# Patient Record
Sex: FEMALE | Race: WHITE | Employment: UNEMPLOYED | URBAN - METROPOLITAN AREA
[De-identification: names, ages, dates, MRNs, and addresses within clinical notes are randomized per-mention and may not be internally consistent; named-entity substitution may affect disease eponyms.]

---

## 2017-01-01 ENCOUNTER — HOSPITAL ENCOUNTER (OUTPATIENT)
Dept: OTHER | Age: 32
Discharge: HOME | End: 2017-01-31
Attending: OBSTETRICS & GYNECOLOGY | Admitting: OBSTETRICS & GYNECOLOGY

## 2017-01-05 ENCOUNTER — ROUTINE PRENATAL (OUTPATIENT)
Dept: PERINATAL CARE | Age: 32
End: 2017-01-05

## 2017-01-05 DIAGNOSIS — Z36.9 ANTENATAL SCREENING ENCOUNTER: Primary | ICD-10-CM

## 2017-01-12 ENCOUNTER — HOSPITAL ENCOUNTER (OUTPATIENT)
Dept: GENERAL RADIOLOGY | Age: 32
Discharge: HOME | End: 2017-01-12
Attending: INTERNAL MEDICINE | Admitting: INTERNAL MEDICINE

## 2017-01-12 ENCOUNTER — ROUTINE PRENATAL (OUTPATIENT)
Dept: PERINATAL CARE | Age: 32
End: 2017-01-12

## 2017-01-12 DIAGNOSIS — Z36.9 ANTENATAL SCREENING ENCOUNTER: Primary | ICD-10-CM

## 2017-01-12 LAB
ANION GAP SERPL CALCULATED.3IONS-SCNC: 13 MMOL/L (ref 3–16)
BUN BLDV-MCNC: 5 MG/DL (ref 7–20)
CALCIUM SERPL-MCNC: 9.2 MG/DL (ref 8.3–10.6)
CHLORIDE BLD-SCNC: 102 MMOL/L (ref 99–110)
CO2: 23 MMOL/L (ref 21–32)
CREAT SERPL-MCNC: <0.5 MG/DL (ref 0.6–1.1)
CREATININE URINE: 156.1 MG/DL (ref 28–259)
GFR AFRICAN AMERICAN: >60
GFR NON-AFRICAN AMERICAN: >60
GLUCOSE BLD-MCNC: 78 MG/DL (ref 70–99)
MAGNESIUM: 1.8 MG/DL (ref 1.8–2.4)
POTASSIUM SERPL-SCNC: 3.9 MMOL/L (ref 3.5–5.1)
PROTEIN PROTEIN: 23 MG/DL
SODIUM BLD-SCNC: 138 MMOL/L (ref 136–145)

## 2017-01-19 ENCOUNTER — ROUTINE PRENATAL (OUTPATIENT)
Dept: PERINATAL CARE | Age: 32
End: 2017-01-19

## 2017-01-19 DIAGNOSIS — Z36.9 ANTENATAL SCREENING ENCOUNTER: Primary | ICD-10-CM

## 2017-01-26 ENCOUNTER — ROUTINE PRENATAL (OUTPATIENT)
Dept: PERINATAL CARE | Age: 32
End: 2017-01-26

## 2017-01-26 DIAGNOSIS — Z36.9 ANTENATAL SCREENING ENCOUNTER: Primary | ICD-10-CM

## 2017-01-31 PROBLEM — O99.213 OBESITY COMPLICATING PREGNANCY IN THIRD TRIMESTER: Status: ACTIVE | Noted: 2017-01-31

## 2017-02-01 ENCOUNTER — HOSPITAL ENCOUNTER (OUTPATIENT)
Dept: OTHER | Age: 32
Discharge: HOME | End: 2017-02-28
Attending: OBSTETRICS & GYNECOLOGY | Admitting: OBSTETRICS & GYNECOLOGY

## 2017-02-01 PROBLEM — Z98.891 STATUS POST PRIMARY LOW TRANSVERSE CESAREAN SECTION: Status: ACTIVE | Noted: 2017-02-01

## 2017-08-03 DIAGNOSIS — M79.672 BILATERAL FOOT PAIN: Primary | ICD-10-CM

## 2017-08-03 DIAGNOSIS — M79.671 BILATERAL FOOT PAIN: Primary | ICD-10-CM

## 2017-08-03 PROCEDURE — 73630 X-RAY EXAM OF FOOT: CPT | Performed by: ORTHOPAEDIC SURGERY

## 2017-11-17 ENCOUNTER — TELEPHONE (OUTPATIENT)
Dept: BARIATRICS/WEIGHT MGMT | Age: 32
End: 2017-11-17

## 2017-11-29 ENCOUNTER — HOSPITAL ENCOUNTER (OUTPATIENT)
Dept: ULTRASOUND IMAGING | Age: 32
Discharge: HOME | End: 2017-11-29
Attending: FAMILY MEDICINE | Admitting: FAMILY MEDICINE

## 2017-11-29 DIAGNOSIS — R10.30 INGUINAL PAIN, UNSPECIFIED LATERALITY: ICD-10-CM

## 2017-11-29 DIAGNOSIS — R10.30 LOWER ABDOMINAL PAIN: ICD-10-CM

## 2018-02-07 ENCOUNTER — OFFICE VISIT (OUTPATIENT)
Dept: ORTHOPEDIC SURGERY | Age: 33
End: 2018-02-07

## 2018-02-07 VITALS
DIASTOLIC BLOOD PRESSURE: 78 MMHG | WEIGHT: 293 LBS | SYSTOLIC BLOOD PRESSURE: 111 MMHG | HEIGHT: 67 IN | BODY MASS INDEX: 45.99 KG/M2 | HEART RATE: 90 BPM

## 2018-02-07 DIAGNOSIS — M54.6 THORACIC SPINE PAIN: ICD-10-CM

## 2018-02-07 DIAGNOSIS — M54.9 BACK PAIN, UNSPECIFIED BACK LOCATION, UNSPECIFIED BACK PAIN LATERALITY, UNSPECIFIED CHRONICITY: Primary | ICD-10-CM

## 2018-02-07 DIAGNOSIS — M79.18 MYOFACIAL MUSCLE PAIN: ICD-10-CM

## 2018-02-07 DIAGNOSIS — M54.2 CERVICAL PAIN: ICD-10-CM

## 2018-02-07 PROCEDURE — G8417 CALC BMI ABV UP PARAM F/U: HCPCS | Performed by: PHYSICAL MEDICINE & REHABILITATION

## 2018-02-07 PROCEDURE — G8427 DOCREV CUR MEDS BY ELIG CLIN: HCPCS | Performed by: PHYSICAL MEDICINE & REHABILITATION

## 2018-02-07 PROCEDURE — G8484 FLU IMMUNIZE NO ADMIN: HCPCS | Performed by: PHYSICAL MEDICINE & REHABILITATION

## 2018-02-07 PROCEDURE — 1036F TOBACCO NON-USER: CPT | Performed by: PHYSICAL MEDICINE & REHABILITATION

## 2018-02-07 PROCEDURE — 99204 OFFICE O/P NEW MOD 45 MIN: CPT | Performed by: PHYSICAL MEDICINE & REHABILITATION

## 2018-02-07 RX ORDER — TIZANIDINE 4 MG/1
TABLET ORAL
Qty: 90 TABLET | Refills: 0 | Status: SHIPPED | OUTPATIENT
Start: 2018-02-07 | End: 2018-05-14

## 2018-02-07 NOTE — PROGRESS NOTES
New Patient: SPINE    CHIEF COMPLAINT:    Chief Complaint   Patient presents with    Back Pain     whole back       HISTORY OF PRESENT ILLNESS:                The patient is a 27 y.o. female referred by  Dr Yamini Cadet for back pain. Atraumatic diffuse spine pain since 11 yo. No inciting event. Reports pain from neck to low back. No radiating extremity pain. No n/t, no weakness, no f/c; no b/b incont. Past Medical History:   Diagnosis Date    Anemia     Arthritis     Asthma     Back pain     Chronic kidney disease     proteinuria    Depression     bipolar depression-no meds    Macrosomia     history of in 2010 9lb 3 oz    Molar pregnancy     history of and also history of a partial molar pregnancy    Postpartum depression     baby blues after 2 deliveries    Proteinuria     Trichomonas infection 09/29/2016    history of    Vitamin D deficiency           Pain Assessment  Location of Pain: Back  Severity of Pain: 8  Quality of Pain: Sharp  Duration of Pain: Persistent  Frequency of Pain: Constant  Aggravating Factors: Other (Comment) (everything)  Limiting Behavior: Yes  Result of Injury: No  Work-Related Injury: No  Are there other pain locations you wish to document?: No    The pain assessment was noted & reviewed in the medical record today.      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    Past Medical History: Medical history form was reviewed today & scanned into the media tab  Past Medical History:   Diagnosis Date    Anemia     Arthritis     Asthma     Back pain     Chronic kidney disease     proteinuria    Depression     bipolar depression-no meds    Macrosomia     history of in 2010 9lb 3 oz    Molar pregnancy     history of and also history of a partial molar pregnancy    Postpartum depression     baby blues after 2 deliveries    Proteinuria  Trichomonas infection 09/29/2016    history of    Vitamin D deficiency       Past Surgical History:     Past Surgical History:   Procedure Laterality Date    ABDOMEN SURGERY      molar pregnancy 8/14    CHOLECYSTECTOMY      DILATION AND CURETTAGE OF UTERUS  8/19/13    SUCTION    OTHER SURGICAL HISTORY  8-21-14    D & E     Current Medications:     Current Outpatient Prescriptions:     albuterol (PROVENTIL HFA;VENTOLIN HFA) 108 (90 BASE) MCG/ACT inhaler, Inhale 2 puffs into the lungs every 6 hours as needed. , Disp: , Rfl:   Allergies:  Bactrim [sulfamethoxazole-trimethoprim]  Social History:    reports that she has never smoked. She has never used smokeless tobacco. She reports that she does not drink alcohol or use drugs. Family History:   Family History   Problem Relation Age of Onset    Arthritis Father     Diabetes Paternal Aunt     Mental Illness Paternal Aunt     Arthritis Paternal Aunt     Glaucoma Paternal Aunt     High Blood Pressure Paternal Grandmother     Stroke Paternal Grandmother     Diabetes Paternal Grandmother     High Blood Pressure Paternal Grandfather     Diabetes Paternal Grandfather        REVIEW OF SYSTEMS: Full ROS noted & scanned   CONSTITUTIONAL: Denies unexplained weight loss, fevers, chills or fatigue  NEUROLOGICAL: Denies unsteady gait or progressive weakness  MUSCULOSKELETAL: Denies joint swelling or redness  PSYCHOLOGICAL: Denies anxiety, depression   SKIN: Denies skin changes, delayed healing, rash, itching   HEMATOLOGIC: Denies easy bleeding or bruising  ENDOCRINE: Denies excessive thirst, urination, heat/cold  RESPIRATORY: Denies current dyspnea, cough  GI: Denies nausea, vomiting, diarrhea   : Denies bowel or bladder issues       PHYSICAL EXAM:    Vitals: Blood pressure 111/78, pulse 90, height 5' 7.01\" (1.702 m), weight (!) 320 lb 1.7 oz (145.2 kg), unknown if currently breastfeeding.     GENERAL EXAM:  · General Apparence: Patient is adequately groomed with

## 2018-05-14 ENCOUNTER — OFFICE VISIT (OUTPATIENT)
Dept: PULMONOLOGY | Age: 33
End: 2018-05-14

## 2018-05-14 VITALS
BODY MASS INDEX: 45.99 KG/M2 | RESPIRATION RATE: 18 BRPM | HEART RATE: 80 BPM | OXYGEN SATURATION: 98 % | DIASTOLIC BLOOD PRESSURE: 72 MMHG | SYSTOLIC BLOOD PRESSURE: 114 MMHG | WEIGHT: 293 LBS | HEIGHT: 67 IN | TEMPERATURE: 97.7 F

## 2018-05-14 DIAGNOSIS — R06.83 SNORING: Primary | ICD-10-CM

## 2018-05-14 DIAGNOSIS — R29.818 SUSPECTED SLEEP APNEA: ICD-10-CM

## 2018-05-14 DIAGNOSIS — R53.83 FATIGUE, UNSPECIFIED TYPE: ICD-10-CM

## 2018-05-14 PROCEDURE — 99203 OFFICE O/P NEW LOW 30 MIN: CPT | Performed by: NURSE PRACTITIONER

## 2018-05-14 PROCEDURE — G8427 DOCREV CUR MEDS BY ELIG CLIN: HCPCS | Performed by: NURSE PRACTITIONER

## 2018-05-14 PROCEDURE — G8417 CALC BMI ABV UP PARAM F/U: HCPCS | Performed by: NURSE PRACTITIONER

## 2018-05-14 PROCEDURE — 1036F TOBACCO NON-USER: CPT | Performed by: NURSE PRACTITIONER

## 2018-05-14 ASSESSMENT — SLEEP AND FATIGUE QUESTIONNAIRES
HOW LIKELY ARE YOU TO NOD OFF OR FALL ASLEEP WHILE SITTING AND READING: 1
HOW LIKELY ARE YOU TO NOD OFF OR FALL ASLEEP WHILE SITTING QUIETLY AFTER LUNCH WITHOUT ALCOHOL: 0
HOW LIKELY ARE YOU TO NOD OFF OR FALL ASLEEP IN A CAR, WHILE STOPPED FOR A FEW MINUTES IN TRAFFIC: 0
HOW LIKELY ARE YOU TO NOD OFF OR FALL ASLEEP WHILE SITTING AND TALKING TO SOMEONE: 0
NECK CIRCUMFERENCE (INCHES): 16.75
HOW LIKELY ARE YOU TO NOD OFF OR FALL ASLEEP WHEN YOU ARE A PASSENGER IN A CAR FOR AN HOUR WITHOUT A BREAK: 3
HOW LIKELY ARE YOU TO NOD OFF OR FALL ASLEEP WHILE LYING DOWN TO REST IN THE AFTERNOON WHEN CIRCUMSTANCES PERMIT: 1
HOW LIKELY ARE YOU TO NOD OFF OR FALL ASLEEP WHILE SITTING INACTIVE IN A PUBLIC PLACE: 0
HOW LIKELY ARE YOU TO NOD OFF OR FALL ASLEEP WHILE WATCHING TV: 2
ESS TOTAL SCORE: 7

## 2018-06-21 ENCOUNTER — HOSPITAL ENCOUNTER (OUTPATIENT)
Dept: SLEEP MEDICINE | Age: 33
Discharge: HOME | End: 2018-06-21
Attending: FAMILY MEDICINE | Admitting: FAMILY MEDICINE

## 2018-06-21 DIAGNOSIS — R06.83 SNORING: ICD-10-CM

## 2018-06-21 DIAGNOSIS — R29.818 SUSPECTED SLEEP APNEA: ICD-10-CM

## 2018-06-21 DIAGNOSIS — R53.83 FATIGUE, UNSPECIFIED TYPE: ICD-10-CM

## 2018-06-25 ENCOUNTER — TELEPHONE (OUTPATIENT)
Dept: PULMONOLOGY | Age: 33
End: 2018-06-25

## 2018-06-25 ENCOUNTER — HOSPITAL ENCOUNTER (OUTPATIENT)
Dept: SLEEP MEDICINE | Age: 33
Discharge: HOME | End: 2018-06-27
Attending: NURSE PRACTITIONER | Admitting: NURSE PRACTITIONER

## 2018-06-25 DIAGNOSIS — G47.33 OSA (OBSTRUCTIVE SLEEP APNEA): ICD-10-CM

## 2018-06-25 DIAGNOSIS — G47.33 OSA (OBSTRUCTIVE SLEEP APNEA): Primary | ICD-10-CM

## 2018-06-26 DIAGNOSIS — G47.33 OSA (OBSTRUCTIVE SLEEP APNEA): Primary | ICD-10-CM

## 2018-08-20 ENCOUNTER — OFFICE VISIT (OUTPATIENT)
Dept: PULMONOLOGY | Age: 33
End: 2018-08-20

## 2018-08-20 VITALS
BODY MASS INDEX: 51.69 KG/M2 | OXYGEN SATURATION: 98 % | SYSTOLIC BLOOD PRESSURE: 124 MMHG | HEART RATE: 94 BPM | DIASTOLIC BLOOD PRESSURE: 64 MMHG | TEMPERATURE: 98 F | HEIGHT: 67 IN | RESPIRATION RATE: 18 BRPM

## 2018-08-20 DIAGNOSIS — R06.83 SNORING: ICD-10-CM

## 2018-08-20 DIAGNOSIS — G47.33 OSA ON CPAP: Primary | ICD-10-CM

## 2018-08-20 DIAGNOSIS — E66.01 MORBID OBESITY (HCC): ICD-10-CM

## 2018-08-20 DIAGNOSIS — Z99.89 OSA ON CPAP: Primary | ICD-10-CM

## 2018-08-20 PROCEDURE — G8417 CALC BMI ABV UP PARAM F/U: HCPCS | Performed by: NURSE PRACTITIONER

## 2018-08-20 PROCEDURE — G8427 DOCREV CUR MEDS BY ELIG CLIN: HCPCS | Performed by: NURSE PRACTITIONER

## 2018-08-20 PROCEDURE — 99213 OFFICE O/P EST LOW 20 MIN: CPT | Performed by: NURSE PRACTITIONER

## 2018-08-20 PROCEDURE — 1036F TOBACCO NON-USER: CPT | Performed by: NURSE PRACTITIONER

## 2018-08-20 ASSESSMENT — SLEEP AND FATIGUE QUESTIONNAIRES
HOW LIKELY ARE YOU TO NOD OFF OR FALL ASLEEP WHILE SITTING INACTIVE IN A PUBLIC PLACE: 2
HOW LIKELY ARE YOU TO NOD OFF OR FALL ASLEEP WHILE SITTING QUIETLY AFTER LUNCH WITHOUT ALCOHOL: 0
NECK CIRCUMFERENCE (INCHES): 17
HOW LIKELY ARE YOU TO NOD OFF OR FALL ASLEEP WHILE LYING DOWN TO REST IN THE AFTERNOON WHEN CIRCUMSTANCES PERMIT: 1
ESS TOTAL SCORE: 12
HOW LIKELY ARE YOU TO NOD OFF OR FALL ASLEEP WHEN YOU ARE A PASSENGER IN A CAR FOR AN HOUR WITHOUT A BREAK: 3
HOW LIKELY ARE YOU TO NOD OFF OR FALL ASLEEP WHILE WATCHING TV: 3
HOW LIKELY ARE YOU TO NOD OFF OR FALL ASLEEP WHILE SITTING AND TALKING TO SOMEONE: 0
HOW LIKELY ARE YOU TO NOD OFF OR FALL ASLEEP WHILE SITTING AND READING: 3
HOW LIKELY ARE YOU TO NOD OFF OR FALL ASLEEP IN A CAR, WHILE STOPPED FOR A FEW MINUTES IN TRAFFIC: 0
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
## 2018-08-28 ENCOUNTER — OFFICE VISIT (OUTPATIENT)
Dept: BARIATRICS/WEIGHT MGMT | Age: 33
End: 2018-08-28

## 2018-08-28 VITALS
WEIGHT: 293 LBS | HEIGHT: 67 IN | DIASTOLIC BLOOD PRESSURE: 68 MMHG | HEART RATE: 89 BPM | SYSTOLIC BLOOD PRESSURE: 102 MMHG | BODY MASS INDEX: 45.99 KG/M2 | RESPIRATION RATE: 18 BRPM

## 2018-08-28 DIAGNOSIS — E66.01 MORBID OBESITY WITH BMI OF 50.0-59.9, ADULT (HCC): Primary | ICD-10-CM

## 2018-08-28 DIAGNOSIS — R10.84 GENERALIZED ABDOMINAL PAIN: ICD-10-CM

## 2018-08-28 DIAGNOSIS — G47.33 OSA ON CPAP: ICD-10-CM

## 2018-08-28 DIAGNOSIS — Z99.89 OSA ON CPAP: ICD-10-CM

## 2018-08-28 DIAGNOSIS — Z01.818 PRE-OPERATIVE CLEARANCE: ICD-10-CM

## 2018-08-28 PROCEDURE — 1036F TOBACCO NON-USER: CPT | Performed by: SURGERY

## 2018-08-28 PROCEDURE — G8417 CALC BMI ABV UP PARAM F/U: HCPCS | Performed by: SURGERY

## 2018-08-28 PROCEDURE — G8427 DOCREV CUR MEDS BY ELIG CLIN: HCPCS | Performed by: SURGERY

## 2018-08-28 PROCEDURE — 99205 OFFICE O/P NEW HI 60 MIN: CPT | Performed by: SURGERY

## 2018-08-28 RX ORDER — METHYLPREDNISOLONE 4 MG/1
TABLET ORAL
Refills: 0 | COMMUNITY
Start: 2018-08-21 | End: 2018-09-21

## 2018-08-28 NOTE — PROGRESS NOTES
[facility]) Physicians   General & Laparoscopic Surgery  [employer]    Chief Complaint   Patient presents with    Bariatric, Initial Visit     NP KASIE Mcintosh           HPI:    Janna Ladd is a very pleasant 26 y.o. obese female ,   Body mass index is 50.59 kg/m². And multiple medical problems who is presenting for weight loss surgery evaluation and consultation by Dr. Krzysztof Emerson. Patient has been struggling for several years now with obesity. Patient feels the weight is an obstacle to achieve and perform things in daily living as well risk on health. Tries to diet, and exercise but can't keep the weight off. Patient tried low fat, calorie restriction, and low carb. Patient has participated in meal replacement/liquid diets.    and other regimens, but with no sustainable weight loss. Patient  is very determined to lose weight and be healthy, and is interested in surgical weight loss to achieve this goal.    Otherwise patient denies any nausea, vomiting, fevers, chills, shortness of breath, chest pain, constipation or urinary symptoms.         Pain Assessment   Diffuse harp post prandial generalized abdominal pain, seeing GI and getting EGD 18     Past Medical History:   Diagnosis Date    Anemia     Arthritis     Asthma     Back pain     Chronic kidney disease     proteinuria    Depression     bipolar depression-no meds    Macrosomia     history of in  9lb 3 oz    Molar pregnancy     history of and also history of a partial molar pregnancy    Postpartum depression     baby blues after 2 deliveries    Proteinuria     Trichomonas infection 2016    history of    Vitamin D deficiency      Past Surgical History:   Procedure Laterality Date    ABDOMEN SURGERY      molar pregnancy      SECTION      CHOLECYSTECTOMY      DILATION AND CURETTAGE OF UTERUS  13    SUCTION    OTHER SURGICAL HISTORY  14    D & E     Family History   Problem Patient  has no cervical adenopathy. Right: No supraclavicular adenopathy present. Left: No supraclavicular adenopathy present. Neurological: Patient is alert and oriented to person, place, and time. Patient has normal strength. Coordination and gait normal. GCS eye subscore is 4. GCS verbal subscore is 5. GCS motor subscore is 6. Skin: Skin is warm and dry. No abrasion and no rash noted. Patient  is not diaphoretic. No cyanosis or erythema. Psychiatric: Patient has a normal mood and affect. speech is normal and behavior is normal. Cognition and memory are normal.         Reyne Seip was seen today for bariatric, initial visit. Diagnoses and all orders for this visit:    Morbid obesity with BMI of 50.0-59.9, adult ([facility] [address]  -     CBC Auto Differential; Future  -     Comprehensive Metabolic Panel; Future  -     Lipid Panel; Future  -     TSH with Reflex; Future  -     Vitamin B12 & Folate; Future  -     Iron and TIBC; Future  -     Vitamin D 25 Hydroxy; Future  -     Hemoglobin A1C; Future  -     H PYLORI BREATH TEST; Future  -     CT ABDOMEN PELVIS W WO CONTRAST Additional Contrast? Oral; Future    Pre-operative clearance  -     CBC Auto Differential; Future  -     Comprehensive Metabolic Panel; Future  -     Lipid Panel; Future  -     TSH with Reflex; Future  -     Vitamin B12 & Folate; Future  -     Iron and TIBC; Future  -     Vitamin D 25 Hydroxy; Future  -     Hemoglobin A1C; Future  -     H PYLORI BREATH TEST; Future  -     CT ABDOMEN PELVIS W WO CONTRAST Additional Contrast? Oral; Future    SANTINO on CPAP  -     CBC Auto Differential; Future  -     Comprehensive Metabolic Panel; Future  -     Lipid Panel; Future  -     TSH with Reflex; Future  -     Vitamin B12 & Folate; Future  -     Iron and TIBC; Future  -     Vitamin D 25 Hydroxy;  Future  -     Hemoglobin A1C; Future  -     H PYLORI BREATH TEST; Future  -     CT ABDOMEN PELVIS W WO CONTRAST Additional Contrast? Oral; Future    Generalized abdominal pain  -     CT ABDOMEN PELVIS W WO CONTRAST Additional Contrast? Oral; Future          A/P  Sidney Tyler is a very pleasant 26 y.o. female with Obesity,  Body mass index is 50.59 kg/m². and multiple obesity related co-morbidities. Sidney Tyler is very motivated to lose weight and being more healthy. We discussed how her weight affects her overall health including:  Patient Active Problem List   Diagnosis    Fetal demise    Partial hydatidiform mole    Obesity    Asthma    Depression    Back pain    Arthritis    Poor historian    Proteinuria affecting pregnancy in second trimester    Obesity complicating pregnancy in third trimester    Status post primary low transverse  section    SANTINO on CPAP    Pre-operative clearance    Morbid obesity with BMI of 50.0-59.9, adult ([facility] [address]    Generalized abdominal pain      The patient underwent 30 minutes of dietary counseling. I have reviewed, discussed and agree with the dietary plan. Medical weight loss and different surgical options were discussed in details with patient. Ingrid Cooper is interested in surgical weight loss. Patient is interested in Laparoscopic Sleeve Gastrectomy, which I believe is an excellent option. We will proceed with pre-operative work up labs and studies. Will also petition patient's  insurance for approval for this procedure. I advised the patient that we can't guarantee final insurance approval.    Patient received dietary handouts and education. Patient advised that its their responsibility to follow up for studies, referrals and/or labs ordered today. Also discussed in details the importance of follow up, as well following the recommendations and completing the whole program to improve outcomes when it comes to healthier lifestyle as well weight loss. Patient also advised about risks and benefits being on a strict dietary regimen as well using supplements.  Patient agrees and wants to proceed with weight loss planning Having abdominal pain and seeing GI and getting EGD tomorrow 8/29, will also get CT abd/pelvis to rule out hernias    Patient Instructions   Patient received dietary handouts and education. Labs ordered. Psych Evaluation. CPAP Compliance Report if applicable . H-pylori (testing for bacteria in the stomach). Support Group. PCP Letter. Quit Smoking,  Alcohol and Carbonated Drinks  Weight History 2 yrs. F/U in 4 weeks. CT abdomen and pelvis          Patient advised that its their responsibility to follow up for studies, referrals and/or labs ordered today.

## 2018-08-28 NOTE — PATIENT INSTRUCTIONS
Patient received dietary handouts and education. Labs ordered. Psych Evaluation. CPAP Compliance Report if applicable . H-pylori (testing for bacteria in the stomach). Support Group. PCP Letter. Quit Smoking,  Alcohol and Carbonated Drinks  Weight History 2 yrs. F/U in 4 weeks. Patient advised that its their responsibility to follow up for studies, referrals and/or labs ordered today.

## 2018-08-28 NOTE — PROGRESS NOTES
Luis Miguel Ortiz is a 26 y.o. female with a date of birth of 1985. Vitals:    08/28/18 1036   BP: 102/68   Pulse: 89   Resp: 18    BMI: Body mass index is 50.59 kg/m². Obesity Classification: Class III    Weight History: Wt Readings from Last 3 Encounters:   08/28/18 (!) 323 lb (146.5 kg)   05/14/18 (!) 330 lb (149.7 kg)   02/07/18 (!) 320 lb 1.7 oz (145.2 kg)       Patient's lowest adult weight was 200 lbs at age 23. Patient's highest adult weight was 323 lbs at age 26. Patient has participated in the following weight loss programs: low fat, calorie restriction, and low carb. Patient has participated in meal replacement/liquid diets. Patient has not participated in weight loss medications. Patient is not lactose intolerant. Patient does not have Moravian/cultural food concerns. Patient does not have food allergies. Patient is able to tolerate artificial sweeteners. Patient dines out to a sit down restaurant 1 times per month. Patient dines out to a fast food restaurant 1 times per month. Patient was just diagnosed with sleep apnea and just started on cpap. 24 hour recall/food frequency chart:  Breakfast: yes. Typically does not eat but is trying to - sausage links with salsa  Snack: no.   Lunch: yes. Pork chops, mac and cheese, green beans  Snack: no.   Dinner: yes. Same as lunch  Snack: no.   Drinks throughout the day: no water; sweet tea or Koolaid; soda infrequently, juice  Do you drink alcohol? No    Patient does not meet the criteria for binge eating disorder. Patient does not have grazing. Patient does not have night eating. Patient does have a history of emotional eating or eating out of boredom. Pt states she eats because she is depressed. It does not take an unusually large amount of food for the patient to feel comfortably full. Most days the patient eats until she is full. Patient describes level of activity as sedentary - has an 21 month old.      Surgery  Patient's greatest concern about having surgery is: the surgery itself - boyfriend is having gastric sleeve surgery soon. Patient describes the motivation for weight loss surgery to be: her kids, wants to stay healthy, asthma, back pain, ? ?stomach pain, wearing a boot for ankle. Patient does not feel confident in her ability to make these changes. The patient's expectations of post-surgical eating habits are somewhat realistic. PATIENT IS CONCERNED ABOUT FINANCIAL ISSUES AND ABILITY TO PURCHASE PROTEIN BASED FOODS. Patient states she does understand the consequences of not complying with post-op food guidelines. Patient states she understands the long term changes in food intake that will be necessary for all occasions after surgery for the rest of her life. she does understand the long term food changes. Patient is deemed nutritionally appropriate to proceed ONLY WITH WITH SIGNIFICANT CHANGES TO DIET AND LIFESTYLE    Goals  Weight: whatever is healthy  Health Improvement: improve sleep apnea, improve mobility and energy level, asthma, back pain, generalized abdominal pain - having EGD tomorrow - ??acid reflux    Assessment  Nutritional Needs: RMR=(9.99 x 146.5) + (6.25 x 170) - (4.92 x 33 y.o.) -161  = 2193 kcal x 1.4 (sedentary activity factor)= 3070 kcal - 1000 (for 2 lb weight loss/week)= 2070 kcal.    Plan  Surgical procedure desired: gastric sleeve    Plan/Recommendations: Surgical Procedure: gastric sleeve? ? General weight loss/lifestyle modification strategies discussed (elicit support from others; identify saboteurs; non-food rewards, etc).     Goals:   -Eat 4-5 times daily  -Avoid high fat and high sugar foods  -Include protein with all meals and snacks  -Avoid carbonation and caffeine  -Avoid calorie containing beverages  -Increase physical activity as tolerated    PES Statement:  Overweight/Obesity related to lack of exercise, sedentary lifestyle, unhealthy eating habits, and unsuccessful diet attempts as evidenced by BMI. Body mass index is 50.59 kg/m². .    Will follow up as necessary.     Morales Cavazos

## 2018-08-29 ENCOUNTER — HOSPITAL ENCOUNTER (OUTPATIENT)
Age: 33
Discharge: HOME | End: 2018-08-29
Attending: INTERNAL MEDICINE
Payer: COMMERCIAL

## 2018-08-29 ENCOUNTER — HOSPITAL ENCOUNTER (OUTPATIENT)
Age: 33
Setting detail: OUTPATIENT SURGERY
Discharge: HOME | End: 2018-08-29
Attending: INTERNAL MEDICINE | Admitting: INTERNAL MEDICINE
Payer: COMMERCIAL

## 2018-08-29 VITALS
HEIGHT: 67 IN | DIASTOLIC BLOOD PRESSURE: 76 MMHG | RESPIRATION RATE: 16 BRPM | SYSTOLIC BLOOD PRESSURE: 114 MMHG | WEIGHT: 293 LBS | OXYGEN SATURATION: 100 % | TEMPERATURE: 97.2 F | BODY MASS INDEX: 45.99 KG/M2 | HEART RATE: 76 BPM

## 2018-08-29 LAB
A/G RATIO: 1.3 (ref 1.1–2.2)
ALBUMIN SERPL-MCNC: 4.1 G/DL (ref 3.4–5)
ALP BLD-CCNC: 84 U/L (ref 40–129)
ALT SERPL-CCNC: 14 U/L (ref 10–40)
ANION GAP SERPL CALCULATED.3IONS-SCNC: 14 MMOL/L (ref 3–16)
AST SERPL-CCNC: 13 U/L (ref 15–37)
BASOPHILS ABSOLUTE: 0 K/UL (ref 0–0.2)
BASOPHILS RELATIVE PERCENT: 0.4 %
BILIRUB SERPL-MCNC: 0.3 MG/DL (ref 0–1)
BUN BLDV-MCNC: 10 MG/DL (ref 7–20)
CALCIUM SERPL-MCNC: 9 MG/DL (ref 8.3–10.6)
CHLORIDE BLD-SCNC: 101 MMOL/L (ref 99–110)
CHOLESTEROL, TOTAL: 103 MG/DL (ref 0–199)
CO2: 22 MMOL/L (ref 21–32)
CREAT SERPL-MCNC: 0.7 MG/DL (ref 0.6–1.1)
EOSINOPHILS ABSOLUTE: 0.2 K/UL (ref 0–0.6)
EOSINOPHILS RELATIVE PERCENT: 2.5 %
ESTIMATED AVERAGE GLUCOSE: 93.9 MG/DL
FOLATE: 11.18 NG/ML (ref 4.78–24.2)
GFR AFRICAN AMERICAN: >60
GFR NON-AFRICAN AMERICAN: >60
GLOBULIN: 3.1 G/DL
GLUCOSE BLD-MCNC: 101 MG/DL (ref 70–99)
HBA1C MFR BLD: 4.9 %
HCT VFR BLD CALC: 38.8 % (ref 36–48)
HDLC SERPL-MCNC: 30 MG/DL (ref 40–60)
HEMOGLOBIN: 12.7 G/DL (ref 12–16)
IRON: 45 UG/DL (ref 37–145)
LDL CHOLESTEROL CALCULATED: 53 MG/DL
LYMPHOCYTES ABSOLUTE: 2.7 K/UL (ref 1–5.1)
LYMPHOCYTES RELATIVE PERCENT: 33.4 %
MCH RBC QN AUTO: 25.1 PG (ref 26–34)
MCHC RBC AUTO-ENTMCNC: 32.6 G/DL (ref 31–36)
MCV RBC AUTO: 76.9 FL (ref 80–100)
MONOCYTES ABSOLUTE: 0.4 K/UL (ref 0–1.3)
MONOCYTES RELATIVE PERCENT: 5.5 %
NEUTROPHILS ABSOLUTE: 4.7 K/UL (ref 1.7–7.7)
NEUTROPHILS RELATIVE PERCENT: 58.2 %
PDW BLD-RTO: 14.8 % (ref 12.4–15.4)
PLATELET # BLD: 253 K/UL (ref 135–450)
PMV BLD AUTO: 9.4 FL (ref 5–10.5)
POTASSIUM SERPL-SCNC: 3.9 MMOL/L (ref 3.5–5.1)
PREGNANCY, URINE: NEGATIVE
RBC # BLD: 5.05 M/UL (ref 4–5.2)
SODIUM BLD-SCNC: 137 MMOL/L (ref 136–145)
TOTAL IRON BINDING CAPACITY: 321 UG/DL (ref 260–445)
TOTAL PROTEIN: 7.2 G/DL (ref 6.4–8.2)
TRIGL SERPL-MCNC: 102 MG/DL (ref 0–150)
TSH REFLEX: 2.96 UIU/ML (ref 0.27–4.2)
VITAMIN B-12: 393 PG/ML (ref 211–911)
VITAMIN D 25-HYDROXY: 22.5 NG/ML
VLDLC SERPL CALC-MCNC: 20 MG/DL
WBC # BLD: 8.1 K/UL (ref 4–11)

## 2018-08-29 PROCEDURE — 84443 ASSAY THYROID STIM HORMONE: CPT

## 2018-08-29 PROCEDURE — 85025 COMPLETE CBC W/AUTO DIFF WBC: CPT

## 2018-08-29 PROCEDURE — 80053 COMPREHEN METABOLIC PANEL: CPT

## 2018-08-29 PROCEDURE — 83013 H PYLORI (C-13) BREATH: CPT

## 2018-08-29 PROCEDURE — 2709999900 HC NON-CHARGEABLE SUPPLY: Performed by: INTERNAL MEDICINE

## 2018-08-29 PROCEDURE — 82607 VITAMIN B-12: CPT

## 2018-08-29 PROCEDURE — 83550 IRON BINDING TEST: CPT

## 2018-08-29 PROCEDURE — 83540 ASSAY OF IRON: CPT

## 2018-08-29 PROCEDURE — 82306 VITAMIN D 25 HYDROXY: CPT

## 2018-08-29 PROCEDURE — 88342 IMHCHEM/IMCYTCHM 1ST ANTB: CPT

## 2018-08-29 PROCEDURE — 83036 HEMOGLOBIN GLYCOSYLATED A1C: CPT

## 2018-08-29 PROCEDURE — 6360000002 HC RX W HCPCS: Performed by: INTERNAL MEDICINE

## 2018-08-29 PROCEDURE — 99152 MOD SED SAME PHYS/QHP 5/>YRS: CPT | Performed by: INTERNAL MEDICINE

## 2018-08-29 PROCEDURE — 3609012400 HC EGD TRANSORAL BIOPSY SINGLE/MULTIPLE: Performed by: INTERNAL MEDICINE

## 2018-08-29 PROCEDURE — 36415 COLL VENOUS BLD VENIPUNCTURE: CPT

## 2018-08-29 PROCEDURE — 80061 LIPID PANEL: CPT

## 2018-08-29 PROCEDURE — 88305 TISSUE EXAM BY PATHOLOGIST: CPT

## 2018-08-29 PROCEDURE — 7100000011 HC PHASE II RECOVERY - ADDTL 15 MIN: Performed by: INTERNAL MEDICINE

## 2018-08-29 PROCEDURE — 7100000010 HC PHASE II RECOVERY - FIRST 15 MIN: Performed by: INTERNAL MEDICINE

## 2018-08-29 PROCEDURE — 82746 ASSAY OF FOLIC ACID SERUM: CPT

## 2018-08-29 PROCEDURE — 84703 CHORIONIC GONADOTROPIN ASSAY: CPT

## 2018-08-29 RX ORDER — SODIUM CHLORIDE, SODIUM LACTATE, POTASSIUM CHLORIDE, CALCIUM CHLORIDE 600; 310; 30; 20 MG/100ML; MG/100ML; MG/100ML; MG/100ML
INJECTION, SOLUTION INTRAVENOUS ONCE
Status: DISCONTINUED | OUTPATIENT
Start: 2018-08-29 | End: 2018-08-29 | Stop reason: HOSPADM

## 2018-08-29 RX ORDER — FENTANYL CITRATE 50 UG/ML
INJECTION, SOLUTION INTRAMUSCULAR; INTRAVENOUS PRN
Status: DISCONTINUED | OUTPATIENT
Start: 2018-08-29 | End: 2018-08-29 | Stop reason: HOSPADM

## 2018-08-29 RX ORDER — MIDAZOLAM HYDROCHLORIDE 5 MG/ML
INJECTION INTRAMUSCULAR; INTRAVENOUS PRN
Status: DISCONTINUED | OUTPATIENT
Start: 2018-08-29 | End: 2018-08-29 | Stop reason: HOSPADM

## 2018-08-29 ASSESSMENT — PAIN SCALES - GENERAL
PAINLEVEL_OUTOF10: 0
PAINLEVEL_OUTOF10: 0
PAINLEVEL_OUTOF10: 9
PAINLEVEL_OUTOF10: 0

## 2018-08-29 ASSESSMENT — PAIN DESCRIPTION - LOCATION: LOCATION: ABDOMEN

## 2018-08-29 ASSESSMENT — PAIN DESCRIPTION - DESCRIPTORS: DESCRIPTORS: SHARP

## 2018-08-29 ASSESSMENT — PAIN DESCRIPTION - PAIN TYPE: TYPE: CHRONIC PAIN

## 2018-08-29 NOTE — OP NOTE
[address]                  [address] [address]                                 OPERATIVE REPORT    PATIENT NAME: Cayetano Bang                          :        1985  MED REC NO:   [id number]                          ROOM:  ACCOUNT NO:   [de-identified]                           ADMIT DATE: 2018  PROVIDER:     Masha Marley MD    DATE OF PROCEDURE:  2018    PREOPERATIVE DIAGNOSIS:  Abdominal pain. POSTOPERATIVE DIAGNOSIS:  Gastritis. PROCEDURE DONE:  EGD and biopsy. SURGEON:  Masha Marley MD    INDICATIONS:  This is a 49-year-old referred for evaluation because of  abdominal pain, persistent in nature. The patient had extensive  investigation, which was normal.  Because of persistent abdominal pain, she  has been brought for an EGD. Informed consent was obtained. Sedation was  given with fentanyl and Versed. Continuous cardiac, oxygen, and blood  pressure monitoring done. FINDINGS:  ESOPHAGUS:  Upper, middle, and lower esophagus was normal.  STOMACH:  Cardia, fundus, and body normal.  Antrum showed some mild  gastritis. Biopsy obtained. DUODENUM:  Normal.    IMPRESSION:  Mild gastritis, normal endoscopy. Otherwise, etiology of  symptoms is unclear. SUGGESTIONS:  At this point at least, she is being evaluated by the weight  loss surgeons to decide regarding surgery, as I do not see any  contraindications with regard to the abdomen for her to go through this.         Damián Stafford MD    D: 2018 8:37:31       T: 2018 14:41:49     [id number]  Job#: [id number]     Doc#: [id number]    CC:  Finesse Mooney MD

## 2018-08-29 NOTE — PROCEDURES
Tereso Sebastian   8/29/2018  Esophagogastroduodenoscopy  A pre-procedure re-evaluation was performed immediately prior to the procedure.   Preprocedure Dx: abd pain  Postprocedure Dx: gastritis  Medications: Procedural sedation with Versed & Fentanyl  Complications: None  Estimated Blood Loss: <5cc  Specimens: were obtained  Alrfed Christiansen

## 2018-08-31 LAB — H PYLORI BREATH TEST: NEGATIVE

## 2018-09-19 ENCOUNTER — HOSPITAL ENCOUNTER (OUTPATIENT)
Dept: CT IMAGING | Age: 33
Discharge: HOME | End: 2018-09-19
Payer: COMMERCIAL

## 2018-09-19 DIAGNOSIS — E66.01 MORBID OBESITY WITH BMI OF 50.0-59.9, ADULT (HCC): ICD-10-CM

## 2018-09-19 DIAGNOSIS — Z01.818 PRE-OPERATIVE CLEARANCE: ICD-10-CM

## 2018-09-19 DIAGNOSIS — G47.33 OSA ON CPAP: ICD-10-CM

## 2018-09-19 DIAGNOSIS — Z99.89 OSA ON CPAP: ICD-10-CM

## 2018-09-19 DIAGNOSIS — R10.84 GENERALIZED ABDOMINAL PAIN: ICD-10-CM

## 2018-09-19 PROCEDURE — 6360000004 HC RX CONTRAST MEDICATION: Performed by: SURGERY

## 2018-09-19 PROCEDURE — 74178 CT ABD&PLV WO CNTR FLWD CNTR: CPT

## 2018-09-19 RX ADMIN — IOHEXOL 50 ML: 240 INJECTION, SOLUTION INTRATHECAL; INTRAVASCULAR; INTRAVENOUS; ORAL at 10:15

## 2018-09-19 RX ADMIN — IOPAMIDOL 100 ML: 755 INJECTION, SOLUTION INTRAVENOUS at 10:15

## 2018-09-21 ENCOUNTER — OFFICE VISIT (OUTPATIENT)
Dept: BARIATRICS/WEIGHT MGMT | Age: 33
End: 2018-09-21

## 2018-09-21 VITALS
SYSTOLIC BLOOD PRESSURE: 119 MMHG | WEIGHT: 293 LBS | HEART RATE: 84 BPM | DIASTOLIC BLOOD PRESSURE: 76 MMHG | BODY MASS INDEX: 45.99 KG/M2 | HEIGHT: 67 IN | RESPIRATION RATE: 20 BRPM | OXYGEN SATURATION: 99 %

## 2018-09-21 DIAGNOSIS — E78.6 LOW HDL (UNDER 40): ICD-10-CM

## 2018-09-21 DIAGNOSIS — E66.01 MORBID OBESITY WITH BMI OF 50.0-59.9, ADULT (HCC): Primary | ICD-10-CM

## 2018-09-21 DIAGNOSIS — R73.01 IFG (IMPAIRED FASTING GLUCOSE): ICD-10-CM

## 2018-09-21 DIAGNOSIS — E55.9 VITAMIN D DEFICIENCY: ICD-10-CM

## 2018-09-21 DIAGNOSIS — Z99.89 OSA ON CPAP: ICD-10-CM

## 2018-09-21 DIAGNOSIS — G47.33 OSA ON CPAP: ICD-10-CM

## 2018-09-21 PROCEDURE — 99213 OFFICE O/P EST LOW 20 MIN: CPT | Performed by: NURSE PRACTITIONER

## 2018-09-21 PROCEDURE — G8417 CALC BMI ABV UP PARAM F/U: HCPCS | Performed by: NURSE PRACTITIONER

## 2018-09-21 PROCEDURE — G8427 DOCREV CUR MEDS BY ELIG CLIN: HCPCS | Performed by: NURSE PRACTITIONER

## 2018-09-21 PROCEDURE — 1036F TOBACCO NON-USER: CPT | Performed by: NURSE PRACTITIONER

## 2018-09-21 ASSESSMENT — ENCOUNTER SYMPTOMS
EYES NEGATIVE: 1
ABDOMINAL PAIN: 1
RESPIRATORY NEGATIVE: 1

## 2018-09-21 NOTE — PROGRESS NOTES
[facility]) Physicians   [employer]    Subjective:      Patient ID: Mikayla Cid is a 26 y.o. female    HPI    Giuliano Martino is a very pleasant 26 y.o. female with Body mass index is 50.84 kg/m². Lex Sousa Past Medical History:   Diagnosis Date    Anemia     Arthritis     Asthma     Back pain     Chronic kidney disease     proteinuria    Depression     bipolar depression-no meds    Macrosomia     history of in  9lb 3 oz    Molar pregnancy     history of and also history of a partial molar pregnancy    Postpartum depression     baby blues after 2 deliveries    Proteinuria     Trichomonas infection 2016    history of    Vitamin D deficiency      Past Surgical History:   Procedure Laterality Date    ABDOMEN SURGERY      molar pregnancy      SECTION      CHOLECYSTECTOMY      DILATION AND CURETTAGE OF UTERUS  13    SUCTION    OTHER SURGICAL HISTORY  14    D & E    UPPER GASTROINTESTINAL ENDOSCOPY N/A 2018    EGD BIOPSY performed by Fede Pike MD at [address] [facility]     Family History   Problem Relation Age of Onset    Arthritis Father     Alcohol Abuse Father     Diabetes Paternal Aunt     Mental Illness Paternal Aunt     Arthritis Paternal Aunt     Glaucoma Paternal Aunt     High Blood Pressure Paternal Grandmother     Stroke Paternal Grandmother     Diabetes Paternal Grandmother     High Blood Pressure Paternal Grandfather     Diabetes Paternal Grandfather     Alcohol Abuse Mother     Alcohol Abuse Maternal Aunt     Alcohol Abuse Maternal Grandmother      Social History   Substance Use Topics    Smoking status: Never Smoker    Smokeless tobacco: Never Used    Alcohol use No     I counseled the patient on the importance of not smoking and risks of ETOH.    Allergies   Allergen Reactions    Sulfamethoxazole      Other reaction(s): Hives     Vitals:    18 0932   BP: 119/76   Pulse: 84   Resp: 20   SpO2: 99%   Weight: (!) 324 lb 9.6 oz (147.2 kg)   Height: 5' 7\" (1.702 m)        Body mass index is 50.84 kg/m². Current Outpatient Prescriptions:     NONFORMULARY, Patient unsure of name, injected birth control, Disp: , Rfl:     albuterol (PROVENTIL HFA;VENTOLIN HFA) 108 (90 BASE) MCG/ACT inhaler, Inhale 2 puffs into the lungs every 6 hours as needed. , Disp: , Rfl:       Review of Systems   Constitutional: Negative. HENT: Negative. Eyes: Negative. Respiratory: Negative. Cardiovascular: Negative. Gastrointestinal: Positive for abdominal pain. Skin: Negative. Neurological: Negative. Objective:   Physical Exam   Constitutional: She is oriented to person, place, and time. She appears well-developed and well-nourished. HENT:   Head: Normocephalic and atraumatic. Eyes: Pupils are equal, round, and reactive to light. Neck: Normal range of motion. Cardiovascular: Normal rate. Pulmonary/Chest: Effort normal.   Abdominal: There is tenderness. There is no rebound and no guarding. Generalized abdominal tenderness   Musculoskeletal: Normal range of motion. Neurological: She is alert and oriented to person, place, and time. Psychiatric: She has a normal mood and affect. Her behavior is normal. Judgment and thought content normal.         Assessment and Plan:       Patient is here for their 2nd presurgery visit for sleeve, gained 1.6lbs. The patient's current Body mass index is 50.84 kg/m². (9/21/18). She  is making some dietary and behavior modifications. She did meet with the registered dietitian for continued follow up. I agree with recommendations and plan. She is not specifically exercising but trying to stay active, encouraged physical activity. Discussed preop work up which she is working on. We reviewed her recent labs: low HDL (discussed this can improve with exercise), elevated glucose but normal Ha1c, low Vitamin D (recommended 2000 IU Vitamin D daily OTC).  We also reviewed her recent CT abd which was unremarkable. She had her EGD completed with GI, pathology showed gastritis. She is not on an acid reducer and reports she does not tolerate any of them. She is still having abdominal pain. I asked her to set up a follow up appointment with GI to discuss her EGD results and abdominal pain. Also explained that her H pylori was negative. We will see her back in 1 month for continued follow up. I have spent 15 min counseling patient.

## 2018-09-21 NOTE — PROGRESS NOTES
Laureen Jordan gained 1.6 lbs over 4 weeks. Wake up 6:30 AM. Eating 3-4 times/day but feels this is difficult to do because of financial restriction. Is avoiding pasta and bread. Breakfast: 8 AM: Egg + salad w/ italian dressing    Lunch: 12-1 PM: Salad w/ chix/italian dressing (previously used ranch) + sometimes peanuts    Snack: Egg OR Peanuts    Dinner: 8-10 PM: Ham/cheese sandwich on wheat bread    Snack: Maybe peanuts    Is pt consuming smaller portions? Eating more frequently instead of 1-2 larger meals    Is pt consuming at least 64 oz of fluids per day? Decaf unsweetened tea - not sure how much    Is pt consuming carbonated, caffeinated, or sugary beverages? yes minute made juiec occasionally  Cut out sweet tea    Has pt sampled Unjury and/or Nectar protein? Encouraged pt to try grab'n'go's before next appointment.     Exercise: just active w/ kids    Plan/Recommendations:   - Measure portion sizes and fluid intake  - Eat 4 small protein meals and snacks/day  - Sample protein powder    Handouts: Portion control + budget friendly [employer]

## 2018-09-21 NOTE — PATIENT INSTRUCTIONS
Goals in preparing for bariatric surgery    You should be giving up all beverages that have carbonation, sugar, and caffeine. (Refer to the approved liquids list provided at initial visit)   You should be drinking 64 ounces of calorie free fluids per day. Suggestions include:  o Water (you may add fresh lemon or lime)  o Crystal Light  o [place] Liquid Water Enhancer  o Propel Zero  o Powerade Zero  o Isopure  o [id number]  o SOBE Lifewater Zero  o Vitamin Water Zero  o Sugar Free Mario Alberto-Aid      You should be eating 4-6 times per day.  Three small meals plus 1-2 snacks per day is your goal. This balances your calories and nutrients evenly throughout the day and helps to boost your metabolism. Snacking is a good thing if you are choosing healthful options. Refer to the snack list provided at your initial visit. Aim for a protein at every snack, plus a fruit, vegetable or starch. You should be eating protein at every meal and snack.  Protein is typically found in animal sources, i.e. chicken, beef, pork, fish and seafood, eggs. It is also found in low-fat dairy sources such as skim or 1% milk, low-fat yogurt, low-fat cheese, and low-fat cottage cheese. Plant based sources of protein include peanut butter, beans, and soy. You should be utilizing the 9-inch plate method.  Eating on a smaller plate will help you control portion size. But what you put on your plate counts also. Make ¼ of your plate protein, ¼ starch and ½ the plate non-starchy vegetables. You should be eliminating caffeine.  Caffeine is dehydrating. After surgery, its very important to stay hydrated. Giving up caffeine before surgery will help you focus on the changes necessary to be successful after surgery. There are many decaffeinated coffee and tea products available in grocery stores. You should be reducing added fat and sugar in your diet.  Frying foods adds too much fat and calories.  Baking, broiling, or grilling meats add flavor without unhealthy fats. Using cooking oil spray and spray butter products are also healthful changes that will aid in your weight loss. Foods high in sugar are often also high in calories and low in nutrients. Eating habits after surgery will have to be a permanent and long-term change. Eating habits are so ingrained that it can be difficult to change. It is important to practice new eating habits prior to surgery to mentally prepare yourself for the challenge ahead. Also remember that overall health, age, and genetics make each persons weight loss progress different. Do not compare your progress (pre or post-operatively), the amount you eat, or exercise to other patients. In addition, it is the responsibility of the patient to schedule and follow up on labs and tests completed during the pre-operative period. Results will be reviewed at each visit. Patient received dietary handouts and education.       Plan/Recommendations:   - Measure portion sizes and fluid intake  - Eat 4 small protein meals and snacks/day  - Sample protein powder    Start Vitamin D 2,000 IU daily (you can get this over the counter)

## 2018-09-27 PROBLEM — Z01.818 PRE-OPERATIVE CLEARANCE: Status: RESOLVED | Noted: 2018-08-28 | Resolved: 2018-09-27

## 2018-10-05 ENCOUNTER — ANESTHESIA EVENT (OUTPATIENT)
Dept: ENDOSCOPY | Age: 33
End: 2018-10-05
Payer: COMMERCIAL

## 2018-10-08 ENCOUNTER — HOSPITAL ENCOUNTER (OUTPATIENT)
Age: 33
Setting detail: OUTPATIENT SURGERY
Discharge: HOME | End: 2018-10-08
Attending: INTERNAL MEDICINE | Admitting: INTERNAL MEDICINE
Payer: COMMERCIAL

## 2018-10-08 ENCOUNTER — ANESTHESIA (OUTPATIENT)
Dept: ENDOSCOPY | Age: 33
End: 2018-10-08
Payer: COMMERCIAL

## 2018-10-08 VITALS
OXYGEN SATURATION: 100 % | RESPIRATION RATE: 17 BRPM | HEIGHT: 67 IN | HEART RATE: 65 BPM | BODY MASS INDEX: 45.99 KG/M2 | TEMPERATURE: 98 F | SYSTOLIC BLOOD PRESSURE: 117 MMHG | WEIGHT: 293 LBS | DIASTOLIC BLOOD PRESSURE: 68 MMHG

## 2018-10-08 VITALS
RESPIRATION RATE: 8 BRPM | DIASTOLIC BLOOD PRESSURE: 68 MMHG | SYSTOLIC BLOOD PRESSURE: 138 MMHG | OXYGEN SATURATION: 91 %

## 2018-10-08 LAB — PREGNANCY, URINE: NEGATIVE

## 2018-10-08 PROCEDURE — 2580000003 HC RX 258: Performed by: INTERNAL MEDICINE

## 2018-10-08 PROCEDURE — 3609018500 HC EGD US SCOPE W/ADJACENT STRUCTURES: Performed by: INTERNAL MEDICINE

## 2018-10-08 PROCEDURE — 88305 TISSUE EXAM BY PATHOLOGIST: CPT

## 2018-10-08 PROCEDURE — 6360000002 HC RX W HCPCS: Performed by: NURSE ANESTHETIST, CERTIFIED REGISTERED

## 2018-10-08 PROCEDURE — 3609012400 HC EGD TRANSORAL BIOPSY SINGLE/MULTIPLE: Performed by: INTERNAL MEDICINE

## 2018-10-08 PROCEDURE — 84703 CHORIONIC GONADOTROPIN ASSAY: CPT

## 2018-10-08 PROCEDURE — 2500000003 HC RX 250 WO HCPCS: Performed by: NURSE ANESTHETIST, CERTIFIED REGISTERED

## 2018-10-08 PROCEDURE — 88342 IMHCHEM/IMCYTCHM 1ST ANTB: CPT

## 2018-10-08 PROCEDURE — 7100000010 HC PHASE II RECOVERY - FIRST 15 MIN: Performed by: INTERNAL MEDICINE

## 2018-10-08 PROCEDURE — 3700000000 HC ANESTHESIA ATTENDED CARE: Performed by: INTERNAL MEDICINE

## 2018-10-08 PROCEDURE — 7100000011 HC PHASE II RECOVERY - ADDTL 15 MIN: Performed by: INTERNAL MEDICINE

## 2018-10-08 PROCEDURE — 3700000001 HC ADD 15 MINUTES (ANESTHESIA): Performed by: INTERNAL MEDICINE

## 2018-10-08 PROCEDURE — 2709999900 HC NON-CHARGEABLE SUPPLY: Performed by: INTERNAL MEDICINE

## 2018-10-08 RX ORDER — LIDOCAINE HYDROCHLORIDE 10 MG/ML
0.3 INJECTION, SOLUTION INFILTRATION; PERINEURAL
Status: DISCONTINUED | OUTPATIENT
Start: 2018-10-08 | End: 2018-10-08 | Stop reason: HOSPADM

## 2018-10-08 RX ORDER — SODIUM CHLORIDE 0.9 % (FLUSH) 0.9 %
10 SYRINGE (ML) INJECTION PRN
Status: DISCONTINUED | OUTPATIENT
Start: 2018-10-08 | End: 2018-10-08 | Stop reason: HOSPADM

## 2018-10-08 RX ORDER — PROPOFOL 10 MG/ML
INJECTION, EMULSION INTRAVENOUS PRN
Status: DISCONTINUED | OUTPATIENT
Start: 2018-10-08 | End: 2018-10-08 | Stop reason: SDUPTHER

## 2018-10-08 RX ORDER — SODIUM CHLORIDE, SODIUM LACTATE, POTASSIUM CHLORIDE, CALCIUM CHLORIDE 600; 310; 30; 20 MG/100ML; MG/100ML; MG/100ML; MG/100ML
INJECTION, SOLUTION INTRAVENOUS CONTINUOUS
Status: DISCONTINUED | OUTPATIENT
Start: 2018-10-08 | End: 2018-10-08 | Stop reason: HOSPADM

## 2018-10-08 RX ORDER — SODIUM CHLORIDE 0.9 % (FLUSH) 0.9 %
10 SYRINGE (ML) INJECTION EVERY 12 HOURS SCHEDULED
Status: DISCONTINUED | OUTPATIENT
Start: 2018-10-08 | End: 2018-10-08 | Stop reason: HOSPADM

## 2018-10-08 RX ORDER — FENTANYL CITRATE 50 UG/ML
INJECTION, SOLUTION INTRAMUSCULAR; INTRAVENOUS PRN
Status: DISCONTINUED | OUTPATIENT
Start: 2018-10-08 | End: 2018-10-08 | Stop reason: SDUPTHER

## 2018-10-08 RX ORDER — SODIUM CHLORIDE, SODIUM LACTATE, POTASSIUM CHLORIDE, CALCIUM CHLORIDE 600; 310; 30; 20 MG/100ML; MG/100ML; MG/100ML; MG/100ML
INJECTION, SOLUTION INTRAVENOUS CONTINUOUS
Status: DISCONTINUED | OUTPATIENT
Start: 2018-10-08 | End: 2018-10-08 | Stop reason: DRUGHIGH

## 2018-10-08 RX ORDER — ROCURONIUM BROMIDE 10 MG/ML
INJECTION, SOLUTION INTRAVENOUS PRN
Status: DISCONTINUED | OUTPATIENT
Start: 2018-10-08 | End: 2018-10-08 | Stop reason: SDUPTHER

## 2018-10-08 RX ADMIN — SUGAMMADEX 200 MG: 100 INJECTION, SOLUTION INTRAVENOUS at 11:43

## 2018-10-08 RX ADMIN — FENTANYL CITRATE 150 MCG: 50 INJECTION, SOLUTION INTRAMUSCULAR; INTRAVENOUS at 11:09

## 2018-10-08 RX ADMIN — PROPOFOL 200 MG: 10 INJECTION, EMULSION INTRAVENOUS at 11:11

## 2018-10-08 RX ADMIN — ROCURONIUM BROMIDE 50 MG: 10 INJECTION, SOLUTION INTRAVENOUS at 11:11

## 2018-10-08 RX ADMIN — SODIUM CHLORIDE, POTASSIUM CHLORIDE, SODIUM LACTATE AND CALCIUM CHLORIDE: 600; 310; 30; 20 INJECTION, SOLUTION INTRAVENOUS at 11:06

## 2018-10-08 ASSESSMENT — PULMONARY FUNCTION TESTS
PIF_VALUE: 7
PIF_VALUE: 22
PIF_VALUE: 4
PIF_VALUE: 23
PIF_VALUE: 21
PIF_VALUE: 21
PIF_VALUE: 13
PIF_VALUE: 22
PIF_VALUE: 22
PIF_VALUE: 16
PIF_VALUE: 22
PIF_VALUE: 22
PIF_VALUE: 7
PIF_VALUE: 23
PIF_VALUE: 19
PIF_VALUE: 8
PIF_VALUE: 2
PIF_VALUE: 8
PIF_VALUE: 21
PIF_VALUE: 21
PIF_VALUE: 2
PIF_VALUE: 8
PIF_VALUE: 18
PIF_VALUE: 21
PIF_VALUE: 0
PIF_VALUE: 21
PIF_VALUE: 22
PIF_VALUE: 21
PIF_VALUE: 21
PIF_VALUE: 2
PIF_VALUE: 21
PIF_VALUE: 21
PIF_VALUE: 22
PIF_VALUE: 22
PIF_VALUE: 21
PIF_VALUE: 23
PIF_VALUE: 1
PIF_VALUE: 20
PIF_VALUE: 22
PIF_VALUE: 21
PIF_VALUE: 23

## 2018-10-08 ASSESSMENT — PAIN SCALES - GENERAL
PAINLEVEL_OUTOF10: 0

## 2018-10-08 ASSESSMENT — PAIN - FUNCTIONAL ASSESSMENT: PAIN_FUNCTIONAL_ASSESSMENT: 0-10

## 2018-10-08 ASSESSMENT — PAIN DESCRIPTION - DESCRIPTORS: DESCRIPTORS: ACHING;CRAMPING

## 2018-10-08 NOTE — ANESTHESIA PRE PROCEDURE
Department of Anesthesiology  Preprocedure Note       Name:  Paige Soares   Age:  26 y.o.  :  1985                                          MRN:  [id number]         Date:  10/8/2018      Surgeon: Yoselin Davis):  Jesús Tang DO    Procedure: Procedure(s):  UPPER EUS/EGD NF W/ANES. UPPER EUS/EGD NF W/ANES. Medications prior to admission:   Prior to Admission medications    Medication Sig Start Date End Date Taking? Authorizing Provider   NONFORMULARY Patient unsure of name, injected birth control   Yes Historical Provider, MD   albuterol (PROVENTIL HFA;VENTOLIN HFA) 108 (90 BASE) MCG/ACT inhaler Inhale 2 puffs into the lungs every 6 hours as needed. Yes Historical Provider, MD       Current medications:    Current Facility-Administered Medications   Medication Dose Route Frequency Provider Last Rate Last Dose    sodium chloride flush 0.9 % injection 10 mL  10 mL Intravenous 2 times per day Michael Quintanilla MD        sodium chloride flush 0.9 % injection 10 mL  10 mL Intravenous PRN Michael Quintanilla MD        lidocaine 1 % injection 0.3 mL  0.3 mL Intradermal Once PRN Michael Quintanilla MD        lactated ringers infusion   Intravenous Continuous Jesús Tang DO           Allergies:     Allergies   Allergen Reactions    Sulfamethoxazole      Other reaction(s): Hives       Problem List:    Patient Active Problem List   Diagnosis Code    Fetal demise [id number]    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

## 2018-10-08 NOTE — OP NOTE
and kidney appeared normal.  The gallbladder was not visualized. The celiac axis and portal confluence appeared normal.  There was no lymphadenopathy noted.        Summary:  1) Normal upper endoscopy and EUS    Recommendations:  1) Assess response to Linzess, PPI, and follow up biopsy results    Cathryn Rued, DO

## 2018-10-23 ENCOUNTER — OFFICE VISIT (OUTPATIENT)
Dept: BARIATRICS/WEIGHT MGMT | Age: 33
End: 2018-10-23
Payer: COMMERCIAL

## 2018-10-23 VITALS
SYSTOLIC BLOOD PRESSURE: 105 MMHG | DIASTOLIC BLOOD PRESSURE: 68 MMHG | BODY MASS INDEX: 45.99 KG/M2 | WEIGHT: 293 LBS | OXYGEN SATURATION: 96 % | HEIGHT: 67 IN | HEART RATE: 84 BPM

## 2018-10-23 DIAGNOSIS — E66.01 MORBID OBESITY WITH BMI OF 50.0-59.9, ADULT (HCC): Primary | ICD-10-CM

## 2018-10-23 DIAGNOSIS — G47.33 OSA ON CPAP: ICD-10-CM

## 2018-10-23 DIAGNOSIS — E55.9 VITAMIN D DEFICIENCY: ICD-10-CM

## 2018-10-23 DIAGNOSIS — E78.6 LOW HDL (UNDER 40): ICD-10-CM

## 2018-10-23 DIAGNOSIS — Z99.89 OSA ON CPAP: ICD-10-CM

## 2018-10-23 DIAGNOSIS — R73.01 IFG (IMPAIRED FASTING GLUCOSE): ICD-10-CM

## 2018-10-23 PROCEDURE — G8427 DOCREV CUR MEDS BY ELIG CLIN: HCPCS | Performed by: NURSE PRACTITIONER

## 2018-10-23 PROCEDURE — G8484 FLU IMMUNIZE NO ADMIN: HCPCS | Performed by: NURSE PRACTITIONER

## 2018-10-23 PROCEDURE — 1036F TOBACCO NON-USER: CPT | Performed by: NURSE PRACTITIONER

## 2018-10-23 PROCEDURE — 99213 OFFICE O/P EST LOW 20 MIN: CPT | Performed by: NURSE PRACTITIONER

## 2018-10-23 PROCEDURE — G8417 CALC BMI ABV UP PARAM F/U: HCPCS | Performed by: NURSE PRACTITIONER

## 2018-10-23 ASSESSMENT — ENCOUNTER SYMPTOMS
RESPIRATORY NEGATIVE: 1
GASTROINTESTINAL NEGATIVE: 1
EYES NEGATIVE: 1

## 2018-10-23 NOTE — PATIENT INSTRUCTIONS
Patient received dietary handouts and education. Goals in preparing for bariatric surgery    You should be giving up all beverages that have carbonation, sugar, and caffeine. (Refer to the approved liquids list provided at initial visit)   You should be drinking 64 ounces of calorie free fluids per day. Suggestions include:  o Water (you may add fresh lemon or lime)  o Crystal Light  o [place] Liquid Water Enhancer  o Propel Zero  o Powerade Zero  o Isopure  o Achyl1B  o SOBE Lifewater Zero  o Vitamin Water Zero  o Sugar Free Mario Alberto-Aid      You should be eating 4-6 times per day.  Three small meals plus 1-2 snacks per day is your goal. This balances your calories and nutrients evenly throughout the day and helps to boost your metabolism. Snacking is a good thing if you are choosing healthful options. Refer to the snack list provided at your initial visit. Aim for a protein at every snack, plus a fruit, vegetable or starch. You should be eating protein at every meal and snack.  Protein is typically found in animal sources, i.e. chicken, beef, pork, fish and seafood, eggs. It is also found in low-fat dairy sources such as skim or 1% milk, low-fat yogurt, low-fat cheese, and low-fat cottage cheese. Plant based sources of protein include peanut butter, beans, and soy. You should be utilizing the 9-inch plate method.  Eating on a smaller plate will help you control portion size. But what you put on your plate counts also. Make ¼ of your plate protein, ¼ starch and ½ the plate non-starchy vegetables. You should be eliminating caffeine.  Caffeine is dehydrating. After surgery, its very important to stay hydrated. Giving up caffeine before surgery will help you focus on the changes necessary to be successful after surgery. There are many decaffeinated coffee and tea products available in grocery stores. You should be reducing added fat and sugar in your diet.    Frying foods adds too much fat and

## 2018-10-23 NOTE — PROGRESS NOTES
gained 0.4 lbs. The patient's current Body mass index is 50.9 kg/m². (10/23/18). She  is making dietary and behavior modifications. She will work on increasing her water intake and including a 4th protein based meal/snack in the day. She did meet with the registered dietitian for continued follow up. I agree with recommendations and plan. She is exercising with walking, encouraged physical activity. Discussed preop workup which includes support group (she is having trouble finding transportation), CPAP compliance and sleep clearance (has upcoming appt), psych evaluation (scheduled), and PCP letter (given sample letters). We will see her back in 1 month for continued follow up. I have spent 15 min counseling patient.

## 2018-11-07 ENCOUNTER — OFFICE VISIT (OUTPATIENT)
Dept: PULMONOLOGY | Age: 33
End: 2018-11-07
Payer: COMMERCIAL

## 2018-11-07 VITALS
TEMPERATURE: 98.3 F | WEIGHT: 293 LBS | BODY MASS INDEX: 45.99 KG/M2 | HEART RATE: 94 BPM | DIASTOLIC BLOOD PRESSURE: 66 MMHG | SYSTOLIC BLOOD PRESSURE: 105 MMHG | OXYGEN SATURATION: 98 % | HEIGHT: 67 IN | RESPIRATION RATE: 16 BRPM

## 2018-11-07 DIAGNOSIS — Z01.818 PRE-OPERATIVE CLEARANCE: ICD-10-CM

## 2018-11-07 DIAGNOSIS — R06.02 SHORTNESS OF BREATH: ICD-10-CM

## 2018-11-07 DIAGNOSIS — E66.01 MORBID OBESITY WITH BMI OF 50.0-59.9, ADULT (HCC): ICD-10-CM

## 2018-11-07 DIAGNOSIS — G47.33 MODERATE OBSTRUCTIVE SLEEP APNEA: Primary | ICD-10-CM

## 2018-11-07 PROCEDURE — G8417 CALC BMI ABV UP PARAM F/U: HCPCS | Performed by: NURSE PRACTITIONER

## 2018-11-07 PROCEDURE — 1036F TOBACCO NON-USER: CPT | Performed by: NURSE PRACTITIONER

## 2018-11-07 PROCEDURE — 99214 OFFICE O/P EST MOD 30 MIN: CPT | Performed by: NURSE PRACTITIONER

## 2018-11-07 PROCEDURE — G8427 DOCREV CUR MEDS BY ELIG CLIN: HCPCS | Performed by: NURSE PRACTITIONER

## 2018-11-07 PROCEDURE — G8484 FLU IMMUNIZE NO ADMIN: HCPCS | Performed by: NURSE PRACTITIONER

## 2018-11-07 ASSESSMENT — SLEEP AND FATIGUE QUESTIONNAIRES
HOW LIKELY ARE YOU TO NOD OFF OR FALL ASLEEP WHILE SITTING AND READING: 0
HOW LIKELY ARE YOU TO NOD OFF OR FALL ASLEEP WHEN YOU ARE A PASSENGER IN A CAR FOR AN HOUR WITHOUT A BREAK: 0
HOW LIKELY ARE YOU TO NOD OFF OR FALL ASLEEP IN A CAR, WHILE STOPPED FOR A FEW MINUTES IN TRAFFIC: 0
HOW LIKELY ARE YOU TO NOD OFF OR FALL ASLEEP WHILE LYING DOWN TO REST IN THE AFTERNOON WHEN CIRCUMSTANCES PERMIT: 3
ESS TOTAL SCORE: 6
HOW LIKELY ARE YOU TO NOD OFF OR FALL ASLEEP WHILE SITTING INACTIVE IN A PUBLIC PLACE: 0
NECK CIRCUMFERENCE (INCHES): 17
HOW LIKELY ARE YOU TO NOD OFF OR FALL ASLEEP WHILE SITTING QUIETLY AFTER LUNCH WITHOUT ALCOHOL: 0
HOW LIKELY ARE YOU TO NOD OFF OR FALL ASLEEP WHILE SITTING AND TALKING TO SOMEONE: 0
HOW LIKELY ARE YOU TO NOD OFF OR FALL ASLEEP WHILE WATCHING TV: 3

## 2018-11-07 NOTE — PROGRESS NOTES
rhythm. No murmur or rub. +1BLE edema. GI: Non-tender. Non-distended. No hernia. Skin: Warm and dry. No nodule on exposed extremities. Lymph: No cervical LAD. No supraclavicular LAD. M/S: No cyanosis. No joint deformity. No clubbing. Neuro: Awake. Alert. Moves all four extremities. Psych: Oriented x 3. No anxiety. DATA reviewed by me:   PSG 6/21/18: AHI 25.1, REM AHI 52, lowest SaO2 82%    CPAP titration 6/25/18: controlled sleep related breathing with CPAP. Recommended CPAP 9cmH2O    Download compliance 7/21/18-8/19/18: average use 3hrs 46mins 29/30 nights with residual AHI of 1.7. Percent of days with usage > 4 hours is 33%. Machine compliance reviewed by me and showed average use 6hrs 30/30 nights with 90% of pressure at 15.2 with residual AHI of 1.6. Percent of days with usage > 4 hours is 97%. Assessment:      Moderate SANTINO - CPAP 9cmH2O. Full face mask. Optimal compliance with optimal efficacy upon review today. Snoring - resolved with CPAP   Observed Sleep Apnea - resolved with CPAP   Dyspnea   Preoperative pulmonary clearance for bariatric surgery, gastric bypass with Dr. Gary Durán at [address] [facility]. Date to be determined. Morbid Obesity - BMI 51.69    Plan:    · CXR and PFTs   · CPAP [id number]  · Advised to use CPAP 6-8 hrs at night and during naps. · Request SD card from DME company   · Replacement of mask, tubing, head straps every 3-6 months or sooner if damaged. · Discussed acclimation techniques at great length today   · Patient instructed to contact [email or web address] for any mask, tubing or machine trouble shooting if problems arise. · Sleep hygiene discussed along with written education in AVS  · Avoid sedatives, alcohol and caffeinated drinks at bed time. · Patient counseled to never drive or operate heavy machinery while fatigue, drowsy or sleepy. · Weight loss is also recommended as a long-term intervention.     · Complications of SANTINO if not treated were discussed with the patient to including: systemic hypertension, pulmonary hypertension, cardiovascular morbidities, car accidents and all cause mortality. More than 25 minutes of time was spent in direct patient contact during this visit, more than 50% of which was spent counseling and/or coordinating care for SANTINO and pulmonary preoperative clearance.

## 2018-11-21 ENCOUNTER — TELEPHONE (OUTPATIENT)
Dept: PULMONOLOGY | Age: 33
End: 2018-11-21

## 2018-11-30 ENCOUNTER — OFFICE VISIT (OUTPATIENT)
Dept: BARIATRICS/WEIGHT MGMT | Age: 33
End: 2018-11-30
Payer: COMMERCIAL

## 2018-11-30 VITALS
WEIGHT: 293 LBS | HEART RATE: 86 BPM | BODY MASS INDEX: 45.99 KG/M2 | SYSTOLIC BLOOD PRESSURE: 134 MMHG | RESPIRATION RATE: 20 BRPM | HEIGHT: 67 IN | OXYGEN SATURATION: 98 % | DIASTOLIC BLOOD PRESSURE: 78 MMHG

## 2018-11-30 DIAGNOSIS — E78.6 LOW HDL (UNDER 40): ICD-10-CM

## 2018-11-30 DIAGNOSIS — E66.01 MORBID OBESITY WITH BMI OF 50.0-59.9, ADULT (HCC): Primary | ICD-10-CM

## 2018-11-30 DIAGNOSIS — E55.9 VITAMIN D DEFICIENCY: ICD-10-CM

## 2018-11-30 DIAGNOSIS — G47.33 OSA ON CPAP: ICD-10-CM

## 2018-11-30 DIAGNOSIS — R73.01 IFG (IMPAIRED FASTING GLUCOSE): ICD-10-CM

## 2018-11-30 DIAGNOSIS — Z99.89 OSA ON CPAP: ICD-10-CM

## 2018-11-30 PROCEDURE — 99213 OFFICE O/P EST LOW 20 MIN: CPT | Performed by: NURSE PRACTITIONER

## 2018-11-30 PROCEDURE — G8417 CALC BMI ABV UP PARAM F/U: HCPCS | Performed by: NURSE PRACTITIONER

## 2018-11-30 PROCEDURE — G8484 FLU IMMUNIZE NO ADMIN: HCPCS | Performed by: NURSE PRACTITIONER

## 2018-11-30 PROCEDURE — 1036F TOBACCO NON-USER: CPT | Performed by: NURSE PRACTITIONER

## 2018-11-30 PROCEDURE — G8427 DOCREV CUR MEDS BY ELIG CLIN: HCPCS | Performed by: NURSE PRACTITIONER

## 2018-11-30 RX ORDER — MEDROXYPROGESTERONE ACETATE 150 MG/ML
INJECTION, SUSPENSION INTRAMUSCULAR
COMMUNITY
End: 2021-08-22

## 2018-11-30 RX ORDER — FERROUS SULFATE 325(65) MG
325 TABLET ORAL
COMMUNITY
End: 2021-08-22

## 2018-11-30 ASSESSMENT — ENCOUNTER SYMPTOMS
GASTROINTESTINAL NEGATIVE: 1
RESPIRATORY NEGATIVE: 1
EYES NEGATIVE: 1

## 2018-11-30 NOTE — PROGRESS NOTES
Smokeless tobacco: Never Used    Alcohol use No     I counseled the patient on the importance of not smoking and risks of ETOH. Allergies   Allergen Reactions    Sulfamethoxazole      Other reaction(s): Hives     Vitals:    11/30/18 0733   BP: 134/78   Pulse: 86   Resp: 20   SpO2: 98%   Weight: (!) 323 lb (146.5 kg)   Height: 5' 7\" (1.702 m)        Body mass index is 50.59 kg/m². Current Outpatient Prescriptions:     medroxyPROGESTERone (DEPO-PROVERA) 150 MG/ML injection, Inject into the muscle, Disp: , Rfl:     ferrous sulfate 325 (65 Fe) MG tablet, Take 325 mg by mouth daily (with breakfast), Disp: , Rfl:     NONFORMULARY, Patient unsure of name, injected birth control, Disp: , Rfl:     albuterol (PROVENTIL HFA;VENTOLIN HFA) 108 (90 BASE) MCG/ACT inhaler, Inhale 2 puffs into the lungs every 6 hours as needed. , Disp: , Rfl:       Review of Systems   Constitutional: Negative. HENT: Negative. Eyes: Negative. Respiratory: Negative. Cardiovascular: Negative. Gastrointestinal: Negative. Skin: Negative. Neurological: Negative. Objective:   Physical Exam   Constitutional: She is oriented to person, place, and time. She appears well-developed and well-nourished. HENT:   Head: Normocephalic and atraumatic. Eyes: Pupils are equal, round, and reactive to light. Neck: Normal range of motion. Cardiovascular: Normal rate. Pulmonary/Chest: Effort normal.   Abdominal: Soft. There is no tenderness. Musculoskeletal: Normal range of motion. Neurological: She is alert and oriented to person, place, and time. Psychiatric: She has a normal mood and affect. Her behavior is normal. Judgment and thought content normal.         Assessment and Plan:      Patient is here for their 4th presurgery visit for sleeve,down 2 lbs. The patient's current Body mass index is 50.59 kg/m². (11/30/18). She  is makingdietary and behavior modifications.  She will work on switching to 1% milk and increasing her total fluids. She did meet with the registereddietitian for continued follow up. I agree with recommendations and plan. She is exercising with walking, encouraged physical activity. Discussed preop workup which she is working on. We will see her back in 1 month for continued follow up. I have spent 15 min counseling patient.

## 2018-12-07 ENCOUNTER — HOSPITAL ENCOUNTER (OUTPATIENT)
Dept: PULMONOLOGY | Age: 33
Discharge: HOME | End: 2018-12-07
Payer: COMMERCIAL

## 2018-12-07 ENCOUNTER — HOSPITAL ENCOUNTER (OUTPATIENT)
Dept: GENERAL RADIOLOGY | Age: 33
Discharge: HOME | End: 2018-12-07
Payer: COMMERCIAL

## 2018-12-07 ENCOUNTER — HOSPITAL ENCOUNTER (OUTPATIENT)
Age: 33
Discharge: HOME | End: 2018-12-07
Payer: COMMERCIAL

## 2018-12-07 VITALS — OXYGEN SATURATION: 99 %

## 2018-12-07 DIAGNOSIS — Z01.818 PRE-OPERATIVE CLEARANCE: ICD-10-CM

## 2018-12-07 PROCEDURE — 94760 N-INVAS EAR/PLS OXIMETRY 1: CPT

## 2018-12-07 PROCEDURE — 94729 DIFFUSING CAPACITY: CPT

## 2018-12-07 PROCEDURE — 94726 PLETHYSMOGRAPHY LUNG VOLUMES: CPT

## 2018-12-07 PROCEDURE — 6360000002 HC RX W HCPCS: Performed by: NURSE PRACTITIONER

## 2018-12-07 PROCEDURE — 94640 AIRWAY INHALATION TREATMENT: CPT

## 2018-12-07 PROCEDURE — 94664 DEMO&/EVAL PT USE INHALER: CPT

## 2018-12-07 PROCEDURE — 71046 X-RAY EXAM CHEST 2 VIEWS: CPT

## 2018-12-07 PROCEDURE — 94060 EVALUATION OF WHEEZING: CPT

## 2018-12-07 RX ORDER — ALBUTEROL SULFATE 2.5 MG/3ML
2.5 SOLUTION RESPIRATORY (INHALATION) ONCE
Status: COMPLETED | OUTPATIENT
Start: 2018-12-07 | End: 2018-12-07

## 2018-12-07 RX ADMIN — ALBUTEROL SULFATE 2.5 MG: 2.5 SOLUTION RESPIRATORY (INHALATION) at 12:47

## 2018-12-08 NOTE — PROCEDURES
[address]                 [address] [address]                               PULMONARY FUNCTION    PATIENT NAME: BILLY LY                          :        1985  MED REC NO:   [id number]                          ROOM:  ACCOUNT NO:   [de-identified]                           ADMIT DATE: 2018  PROVIDER:     Emilia Marsh MD    DATE OF PROCEDURE:  2018    INDICATION:  Preop clearance. FINDINGS:  1. Spirometry revealed no evidence of obstructive defect. FEV1 is 3.31  liters, which is 97% of predicted. No significant response to  bronchodilators. FEV1/FVC ratio of 83%. 2.  Lung volume revealed normal total lung capacity of 5.03 liters,  which is 87% of predicted. 3.  Diffusion capacity is normal at 23.97, which is 87% of predicted. 4.  Flow volume loops appeared to be normal.    CONCLUSION:  1. No evidence of obstructive defect or restrictive defect. 2.  No bronchodilator response. 3.  Normal diffusion capacity.         Riki Grant MD    D: 2018 14:10:16       T: 2018 20:50:06     /[email or web address]  Job#: [id number]     Doc#: [id number]    CC:

## 2018-12-11 ENCOUNTER — OFFICE VISIT (OUTPATIENT)
Dept: PULMONOLOGY | Age: 33
End: 2018-12-11
Payer: COMMERCIAL

## 2018-12-11 VITALS
BODY MASS INDEX: 45.99 KG/M2 | DIASTOLIC BLOOD PRESSURE: 80 MMHG | HEART RATE: 86 BPM | OXYGEN SATURATION: 98 % | WEIGHT: 293 LBS | SYSTOLIC BLOOD PRESSURE: 112 MMHG | HEIGHT: 67 IN | RESPIRATION RATE: 22 BRPM

## 2018-12-11 DIAGNOSIS — Z99.89 OSA ON CPAP: Primary | ICD-10-CM

## 2018-12-11 DIAGNOSIS — Z01.811 ENCOUNTER FOR PREOPERATIVE PULMONARY EXAMINATION: ICD-10-CM

## 2018-12-11 DIAGNOSIS — R06.02 SHORTNESS OF BREATH: ICD-10-CM

## 2018-12-11 DIAGNOSIS — G47.33 OSA ON CPAP: Primary | ICD-10-CM

## 2018-12-11 DIAGNOSIS — E66.01 MORBID OBESITY WITH BMI OF 50.0-59.9, ADULT (HCC): ICD-10-CM

## 2018-12-11 PROCEDURE — G8427 DOCREV CUR MEDS BY ELIG CLIN: HCPCS | Performed by: NURSE PRACTITIONER

## 2018-12-11 PROCEDURE — G8417 CALC BMI ABV UP PARAM F/U: HCPCS | Performed by: NURSE PRACTITIONER

## 2018-12-11 PROCEDURE — G8484 FLU IMMUNIZE NO ADMIN: HCPCS | Performed by: NURSE PRACTITIONER

## 2018-12-11 PROCEDURE — 99213 OFFICE O/P EST LOW 20 MIN: CPT | Performed by: NURSE PRACTITIONER

## 2018-12-11 PROCEDURE — 1036F TOBACCO NON-USER: CPT | Performed by: NURSE PRACTITIONER

## 2018-12-11 ASSESSMENT — SLEEP AND FATIGUE QUESTIONNAIRES
HOW LIKELY ARE YOU TO NOD OFF OR FALL ASLEEP WHILE SITTING QUIETLY AFTER LUNCH WITHOUT ALCOHOL: 3
HOW LIKELY ARE YOU TO NOD OFF OR FALL ASLEEP IN A CAR, WHILE STOPPED FOR A FEW MINUTES IN TRAFFIC: 0
HOW LIKELY ARE YOU TO NOD OFF OR FALL ASLEEP WHEN YOU ARE A PASSENGER IN A CAR FOR AN HOUR WITHOUT A BREAK: 3
HOW LIKELY ARE YOU TO NOD OFF OR FALL ASLEEP WHILE SITTING INACTIVE IN A PUBLIC PLACE: 0
NECK CIRCUMFERENCE (INCHES): 14.75
HOW LIKELY ARE YOU TO NOD OFF OR FALL ASLEEP WHILE LYING DOWN TO REST IN THE AFTERNOON WHEN CIRCUMSTANCES PERMIT: 2
HOW LIKELY ARE YOU TO NOD OFF OR FALL ASLEEP WHILE SITTING AND READING: 0
HOW LIKELY ARE YOU TO NOD OFF OR FALL ASLEEP WHILE WATCHING TV: 3
HOW LIKELY ARE YOU TO NOD OFF OR FALL ASLEEP WHILE SITTING AND TALKING TO SOMEONE: 0
ESS TOTAL SCORE: 11

## 2018-12-11 NOTE — PROGRESS NOTES
[facility] Pulmonary, Critical Care and Sleep Specialist        Patient ID: Viraj Nicholson is a 26 y.o. female who is being seen today for   Chief Complaint   Patient presents with    Follow-up         HPI:     Viraj Nicholson is a 26 y.o. female in office for SANTINO follow up and preoperative pulmonary clearance and follow up for PFT and CXR results. Reviewed with the patient and multiple good questions answered. Patient is using CPAP 4-5 hrs/night. Tolerating pressure. Using humidifier. No snoring on CPAP. The mask is somewhat now comfortable after switching to new full face mask. No mask leak. Daytime sleepiness is improving. No nodding off when driving. No dry nose or throat. No fatigue. Bedtime is 1-2 am and rise time is 8-9am. Sleep onset is a few minutes. Wakes up 0-1 times at night total. No nocturia. It takes afew minutes to fall back a sleep. + naps during the day for 1 hour per day when toddler naps. +headache in am. No weight gain. 1-2 caffienated beverages during the day. Rare alcohol. ESS is 11. Planning for bariatric weight loss surgery, gastric sleeve with Dr. Jony Colón at [facility], date undetermined. +shortness of breath at rest, with conversation and with exertion. No cough. No fever or chills. +history of asthma, worse in the summer months. Asthma triggers include pollen, mold. Uses albuterol PRN, has not needed to use for 2-3 years. No history of O2 use. No history of needing mechanical ventilation. Can walk 1 mile on the flat and climb stairs. Smoked 2-3 cigs daily for 6 months when she was 14 years. No history of difficulty with anesthesia. No recent antibiotics or steroids. Presenting Hx:   Snoring at night for unknown years. The severity of snoring is loud and severe. Snoring is interrupting sleep of others. Worse in supine position and better on the side. Has witnessed apnea. Wakes up at night choking and gasping for air. Never has restorative sleep.  No dry mouth upon & E    NM EGD INTRMURAL NEEDLE ASPIR/BIOP ALTERED ANATOMY N/A 10/8/2018    UPPER EUS/EGD NF W/ANES. performed by Jg Hill DO at [place] [address] N/A 8/29/2018    EGD BIOPSY performed by Shell Grimm MD at [facility] N/A 10/8/2018    EGD BIOPSY performed by Jg Hill DO at [facility]       Allergies:  is allergic to sulfamethoxazole. Social History:    TOBACCO:   reports that she has never smoked. She has never used smokeless tobacco.  ETOH:   reports that she does not drink alcohol. Family History:   Family History   Problem Relation Age of Onset    Arthritis Father     Alcohol Abuse Father     Diabetes Paternal Aunt     Mental Illness Paternal Aunt     Arthritis Paternal Aunt     Glaucoma Paternal Aunt     High Blood Pressure Paternal Grandmother     Stroke Paternal Grandmother     Diabetes Paternal Grandmother     High Blood Pressure Paternal Grandfather     Diabetes Paternal Grandfather     Alcohol Abuse Mother     Alcohol Abuse Maternal Aunt     Alcohol Abuse Maternal Grandmother        Current Medications:    Current Outpatient Prescriptions:     medroxyPROGESTERone (DEPO-PROVERA) 150 MG/ML injection, Inject into the muscle, Disp: , Rfl:     ferrous sulfate 325 (65 Fe) MG tablet, Take 325 mg by mouth daily (with breakfast), Disp: , Rfl:     NONFORMULARY, Patient unsure of name, injected birth control, Disp: , Rfl:     albuterol (PROVENTIL HFA;VENTOLIN HFA) 108 (90 BASE) MCG/ACT inhaler, Inhale 2 puffs into the lungs every 6 hours as needed. , Disp: , Rfl:         Objective:   PHYSICAL EXAM:    /80   Pulse 86   Resp 22   Ht 5' 7\" (1.702 m)   Wt (!) 320 lb 12.8 oz (145.5 kg)   SpO2 98% Comment: on RA  BMI 50.24 kg/m²     Physical exam:  Gen: No distress. Obese. BMI of 50.24  Eyes: PERRL. No sclera icterus. No conjunctival injection. ENT: No discharge.  Pharynx mask, tubing, head straps every 3-6 months or sooner if damaged. · Discussed acclimation techniques at great length today   · Patient instructed to contact DME company for any mask, tubing or machine trouble shooting if problems arise. · Sleep hygiene discussed along with written education in AVS  · Avoid sedatives, alcohol and caffeinated drinks at bed time. · Patient counseled to never drive or operate heavy machinery while fatigue, drowsy or sleepy. · Weight loss is also recommended as a long-term intervention. · Complications of SANTINO if not treated were discussed with the patient to including: systemic hypertension, pulmonary hypertension, cardiovascular morbidities, car accidents and all cause mortality. Upon examination and discussion I have determined that, from a pulmonary standpoint, patient is clear for surgery. Patient has low preoperative pulmonary risk with 6.8% pulmonary complication rate. Perioperative pulmonary complications were discussed with the patient including atelectasis, infection and prolonged mechanical ventilation. I recommend:  1- Airway extubation (if patient requires intubation for surgery) performed only when the patient is fully awake and alert. 2- Removal of the endotracheal tube should take place in the operating room or PACU. 3- The patient should be maintained in the semiupright or lateral, not supine, position and should undergo continuous cardiorespiratory monitoring. 4- Immediate application of CPAP with or without a nasopharyngeal airway will help prevent upper airway collapse. 5- Systemic opioids should be used cautiously because of their ability to depress the respiratory drive  6- Benzodiazepines should be avoided because of their negative effects on the respiratory control and upper airway musculature.

## 2018-12-28 ENCOUNTER — OFFICE VISIT (OUTPATIENT)
Dept: BARIATRICS/WEIGHT MGMT | Age: 33
End: 2018-12-28
Payer: COMMERCIAL

## 2018-12-28 VITALS
DIASTOLIC BLOOD PRESSURE: 62 MMHG | BODY MASS INDEX: 45.99 KG/M2 | HEART RATE: 84 BPM | OXYGEN SATURATION: 99 % | WEIGHT: 293 LBS | HEIGHT: 67 IN | SYSTOLIC BLOOD PRESSURE: 110 MMHG

## 2018-12-28 DIAGNOSIS — E66.01 MORBID OBESITY WITH BMI OF 50.0-59.9, ADULT (HCC): Primary | ICD-10-CM

## 2018-12-28 DIAGNOSIS — Z71.3 DIETARY COUNSELING AND SURVEILLANCE: ICD-10-CM

## 2018-12-28 PROCEDURE — 1036F TOBACCO NON-USER: CPT | Performed by: FAMILY MEDICINE

## 2018-12-28 PROCEDURE — G8417 CALC BMI ABV UP PARAM F/U: HCPCS | Performed by: FAMILY MEDICINE

## 2018-12-28 PROCEDURE — G8427 DOCREV CUR MEDS BY ELIG CLIN: HCPCS | Performed by: FAMILY MEDICINE

## 2018-12-28 PROCEDURE — 99203 OFFICE O/P NEW LOW 30 MIN: CPT | Performed by: FAMILY MEDICINE

## 2018-12-28 PROCEDURE — G8484 FLU IMMUNIZE NO ADMIN: HCPCS | Performed by: FAMILY MEDICINE

## 2018-12-28 ASSESSMENT — ENCOUNTER SYMPTOMS
RESPIRATORY NEGATIVE: 1
GASTROINTESTINAL NEGATIVE: 1
EYES NEGATIVE: 1

## 2018-12-28 NOTE — PROGRESS NOTES
Laureen Jordan lost 1 lbs over past month. Breakfast: Egg + sousa/ham     Snack: Pickles + cheese     Lunch: Hamburger meat (no bread)/porkchop/chix + green beans/corn     Snack: Pickles + Cheese     Dinner: hamburger meat (no bread) OR protein shake made with whole milk (not as much d/t cost and reports she was told it was not the best choice previously)     Snack: no     Fluids: drinking water (50 oz/day), (~30-40 oz/day) decaf tea with sweetener, flavored water, no soda, protein shake made with whole milk (reducing)- pt reports she is still thirsty     Is pt consuming smaller portions? using a smaller plate    Is pt consuming at least 64 oz of fluids per day? yes    Is pt consuming carbonated, caffeinated, or sugary beverages? whole milk    Has pt sampled Unjury and/or Nectar protein? Yes     Exercise: walking up to 20 minutes    Plan/Recommendations: avoid high fat foods such as sousa/whole milk, increase walking (pt reports she wants to get back to walking for hours at a time which is what she used to do).      Handouts: SG schedule     Kathleen Crum

## 2019-01-15 ENCOUNTER — INITIAL CONSULT (OUTPATIENT)
Dept: BARIATRICS/WEIGHT MGMT | Age: 34
End: 2019-01-15
Payer: COMMERCIAL

## 2019-01-15 DIAGNOSIS — E66.01 MORBID OBESITY WITH BMI OF 50.0-59.9, ADULT (HCC): ICD-10-CM

## 2019-01-15 DIAGNOSIS — F41.8 DEPRESSION WITH ANXIETY: Primary | ICD-10-CM

## 2019-01-15 PROCEDURE — 90791 PSYCH DIAGNOSTIC EVALUATION: CPT | Performed by: PSYCHOLOGIST

## 2019-01-28 ENCOUNTER — OFFICE VISIT (OUTPATIENT)
Dept: BARIATRICS/WEIGHT MGMT | Age: 34
End: 2019-01-28
Payer: COMMERCIAL

## 2019-01-28 VITALS
HEIGHT: 67 IN | BODY MASS INDEX: 45.99 KG/M2 | DIASTOLIC BLOOD PRESSURE: 67 MMHG | HEART RATE: 74 BPM | WEIGHT: 293 LBS | RESPIRATION RATE: 18 BRPM | SYSTOLIC BLOOD PRESSURE: 111 MMHG | OXYGEN SATURATION: 99 %

## 2019-01-28 DIAGNOSIS — E78.6 LOW HDL (UNDER 40): ICD-10-CM

## 2019-01-28 DIAGNOSIS — E66.01 MORBID OBESITY WITH BMI OF 50.0-59.9, ADULT (HCC): Primary | ICD-10-CM

## 2019-01-28 DIAGNOSIS — R73.01 IFG (IMPAIRED FASTING GLUCOSE): ICD-10-CM

## 2019-01-28 DIAGNOSIS — G47.33 OSA ON CPAP: ICD-10-CM

## 2019-01-28 DIAGNOSIS — Z99.89 OSA ON CPAP: ICD-10-CM

## 2019-01-28 PROCEDURE — G8427 DOCREV CUR MEDS BY ELIG CLIN: HCPCS | Performed by: NURSE PRACTITIONER

## 2019-01-28 PROCEDURE — G8484 FLU IMMUNIZE NO ADMIN: HCPCS | Performed by: NURSE PRACTITIONER

## 2019-01-28 PROCEDURE — 99213 OFFICE O/P EST LOW 20 MIN: CPT | Performed by: NURSE PRACTITIONER

## 2019-01-28 PROCEDURE — 1036F TOBACCO NON-USER: CPT | Performed by: NURSE PRACTITIONER

## 2019-01-28 PROCEDURE — G8417 CALC BMI ABV UP PARAM F/U: HCPCS | Performed by: NURSE PRACTITIONER

## 2019-01-28 RX ORDER — OXYBUTYNIN CHLORIDE 10 MG/1
TABLET, EXTENDED RELEASE ORAL
COMMUNITY
Start: 2019-01-24 | End: 2021-08-22

## 2019-01-28 RX ORDER — HYDROXYZINE PAMOATE 25 MG/1
CAPSULE ORAL NIGHTLY
COMMUNITY
Start: 2019-01-07 | End: 2021-08-22

## 2019-01-28 RX ORDER — PROPRANOLOL HYDROCHLORIDE 20 MG/1
TABLET ORAL 2 TIMES DAILY
COMMUNITY
Start: 2019-01-07 | End: 2021-08-22

## 2019-01-28 ASSESSMENT — ENCOUNTER SYMPTOMS
GASTROINTESTINAL NEGATIVE: 1
EYES NEGATIVE: 1
RESPIRATORY NEGATIVE: 1
ALLERGIC/IMMUNOLOGIC NEGATIVE: 1

## 2019-01-29 ENCOUNTER — TELEPHONE (OUTPATIENT)
Dept: BARIATRICS/WEIGHT MGMT | Age: 34
End: 2019-01-29

## 2019-01-29 PROBLEM — E66.01 MORBID OBESITY WITH BMI OF 45.0-49.9, ADULT (HCC): Status: ACTIVE | Noted: 2019-01-29

## 2019-01-29 ASSESSMENT — ENCOUNTER SYMPTOMS
ALLERGIC/IMMUNOLOGIC NEGATIVE: 1
RESPIRATORY NEGATIVE: 1
EYES NEGATIVE: 1

## 2019-02-05 ENCOUNTER — OFFICE VISIT (OUTPATIENT)
Dept: BARIATRICS/WEIGHT MGMT | Age: 34
End: 2019-02-05

## 2019-02-05 VITALS
SYSTOLIC BLOOD PRESSURE: 98 MMHG | BODY MASS INDEX: 45.99 KG/M2 | OXYGEN SATURATION: 98 % | HEIGHT: 67 IN | WEIGHT: 293 LBS | DIASTOLIC BLOOD PRESSURE: 64 MMHG | HEART RATE: 73 BPM

## 2019-02-05 DIAGNOSIS — Z99.89 OSA ON CPAP: Primary | ICD-10-CM

## 2019-02-05 DIAGNOSIS — G47.33 OSA ON CPAP: Primary | ICD-10-CM

## 2019-02-05 DIAGNOSIS — R73.01 IFG (IMPAIRED FASTING GLUCOSE): ICD-10-CM

## 2019-02-05 DIAGNOSIS — E66.01 MORBID OBESITY WITH BMI OF 45.0-49.9, ADULT (HCC): ICD-10-CM

## 2019-02-05 PROCEDURE — 99999 PR OFFICE/OUTPT VISIT,PROCEDURE ONLY: CPT | Performed by: NURSE PRACTITIONER

## 2019-02-05 ASSESSMENT — ENCOUNTER SYMPTOMS
CONSTIPATION: 1
DIARRHEA: 0
COUGH: 0
SHORTNESS OF BREATH: 0
ABDOMINAL PAIN: 0
NAUSEA: 0

## 2019-11-26 ENCOUNTER — TELEPHONE (OUTPATIENT)
Dept: PULMONOLOGY | Age: 34
End: 2019-11-26

## 2020-03-05 ENCOUNTER — TELEPHONE (OUTPATIENT)
Dept: PULMONOLOGY | Age: 35
End: 2020-03-05

## 2020-03-05 NOTE — TELEPHONE ENCOUNTER
Patient cancelled appointment on 3/9/20 with Donella Romberg for 1 year follow-up . Reason: Per pt she moved and has misplaced her machine    Patient did reschedule appointment. Appointment rescheduled for 7/10/20. Last OV   Assessment: 12/11/18 Desi Membreno,CNP OV note      · Moderate SANTINO - CPAP 9cmH2O. Full face mask. Optimal compliance with optimal efficacy upon review today. · Snoring - resolved with CPAP   · Observed Sleep Apnea - resolved with CPAP   · Dyspnea - PFTs unremarkable. No obstructive, or restrictive defect. No bronchodilator response. Normal diffusion capacity. Deconditioning and obesity contributing   · Preoperative pulmonary clearance for bariatric surgery, gastric bypass with Dr. Osmani Arias at [facility]. Date to be determined. · Morbid Obesity - BMI 50.24  ·    Plan:       · CPAP [id number]  · Request download compliance   · Advised to use CPAP 6-8 hrs at night and during naps. · Request SD card from DME company   · Replacement of mask, tubing, head straps every 3-6 months or sooner if damaged. · Discussed acclimation techniques at great length today   · Patient instructed to contact [employer] for any mask, tubing or machine trouble shooting if problems arise. · Sleep hygiene discussed along with written education in AVS  · Avoid sedatives, alcohol and caffeinated drinks at bed time. · Patient counseled to never drive or operate heavy machinery while fatigue, drowsy or sleepy. · Weight loss is also recommended as a long-term intervention. · Complications of SANTINO if not treated were discussed with the patient to including: systemic hypertension, pulmonary hypertension, cardiovascular morbidities, car accidents and all cause mortality.     Upon examination and discussion I have determined that, from a pulmonary standpoint, patient is clear for surgery. Patient has low preoperative pulmonary risk with 0.8% pulmonary complication rate.  Perioperative pulmonary complications were discussed with the patient including atelectasis, infection and prolonged mechanical ventilation. I recommend:  1- Airway extubation (if patient requires intubation for surgery) performed only when the patient is fully awake and alert. 2- Removal of the endotracheal tube should take place in the operating room or PACU. 3- The patient should be maintained in the semiupright or lateral, not supine, position and should undergo continuous cardiorespiratory monitoring. 4- Immediate application of CPAP with or without a nasopharyngeal airway will help prevent upper airway collapse. 5- Systemic opioids should be used cautiously because of their ability to depress the respiratory drive  6- Benzodiazepines should be avoided because of their negative effects on the respiratory control and upper airway musculature.

## 2020-07-08 ENCOUNTER — TELEPHONE (OUTPATIENT)
Dept: PULMONOLOGY | Age: 35
End: 2020-07-08

## 2020-07-08 NOTE — TELEPHONE ENCOUNTER
Within this Telehealth Consent, the terms you and yours refer to the person using the Telehealth Service (Service), or in the case of a use of the Service by or on behalf of a minor, you and yours refer to and include (i) the parent or legal guardian who provides consent to the use of the Service by such minor or uses the Service on behalf of such minor, and (ii) the minor for whom consent is being provided or on whose behalf the Service is being utilized. When using Service, you will be consulting with your health care providers via the use of Telehealth.   Telehealth involves the delivery of healthcare services using electronic communications, information technology or other means between a healthcare provider and a patient who are not in the same physical location. Telehealth may be used for diagnosis, treatment, follow-up and/or patient education, and may include, but is not limited to, one or more of the following:    Electronic transmission of medical records, photo images, personal health information or other data between a patient and a healthcare provider    Interactions between a patient and healthcare provider via audio, video and/or data communications    Use of output data from medical devices, sound and video files    Anticipated Benefits   The use of Telehealth by your Provider(s) through the Service may have the following possible benefits:    Making it easier and more efficient for you to access medical care and treatment for the conditions treated by such Provider(s) utilizing the Service    Allowing you to obtain medical care and treatment by Provider(s) at times that are convenient for you    Enabling you to interact with Provider(s) without the necessity of an in-office appointment     Possible Risks   While the use of Telehealth can provide potential benefits for you, there are also potential risks associated with the use of Telehealth.  These risks include, but may not be limited to the following:    Your Provider(s) may not able to provide medical treatment for your particular condition and you may be required to seek alternative healthcare or emergency care services.  The electronic systems or other security protocols or safeguards used in the Service could fail, causing a breach of privacy of your medical or other information.  Given regulatory requirements in certain jurisdictions, your Provider(s) diagnosis and/or treatment options, especially pertaining to certain prescriptions, may be limited. Acceptance   1. You understand that Services will be provided via Telehealth. This process involves the use of HIPAA compliant and secure, real-time audio-visual interfacing with a qualified and appropriately trained provider located at [facility]. 2. You understand that, under no circumstances, will this session be recorded. 3. You understand that the Provider(s) at [facility] and other clinical participants will be party to the information obtained during the Telehealth session in accordance with best medical practices. 4. You understand that the information obtained during the Telehealth session will be used to help determine the most appropriate treatment options. 5. You understand that You have the right to revoke this consent at any point in time. 6. You understand that Telehealth is voluntary, and that continued treatment is not dependent upon consent. 7. You understand that, in the event of non-consent to Telehealth services and/or technical difficulties, you will obtain services as typically provided in the absence of Telehealth technology. 8. You understand that this consent will be kept in Your medical record. 9. No potential benefits from the use of Telehealth or specific results can be guaranteed. Your condition may not be cured or improved and, in some cases, may get worse.    10. There are limitations in the provision of medical care and treatment via Telehealth and the Service and you may not be able to receive diagnosis and/or treatment through the Service for every condition for which you seek diagnosis and/or treatment. 11. There are potential risks to the use of Telehealth, including but not limited to the risks described in this Telehealth Consent. 12. Your Provider(s) have discussed the use of Telehealth and the Service with you, including the benefits and risks of such and you have provided oral consent to your Provider(s) for the use of Telehealth and the Service. 15. You understand that it is your duty to provide your Provider(s) truthful, accurate and complete information, including all relevant information regarding care that you may have received or may be receiving from other healthcare providers outside of the Service. 14. You understand that each of your Provider(s) may determine in his or sole discretion that your condition is not suitable for diagnosis and/or treatment using the Service, and that you may need to seek medical care and treatment a specialist or other healthcare provider, outside of the Service. 15. You understand that you are fully responsible for payment for all services provided by Provider(s) or through use of the Service and that you may not be able to use third-party insurance. 16. You represent that (a) you have read this Telehealth Consent carefully, (b) you understand the risks and benefits of the Service and the use of Telehealth in the medical care and treatment provided to you by Provider(s) using the Service, and (c) you have the legal capacity and authority to provide this consent for yourself and/or the minor for which you are consenting under applicable federal and state laws, including laws relating to the age of [de-identified] and/or parental/guardian consent.    17. You give your informed consent to the use of Telehealth by Provider(s) using the Service under the terms described in the Terms of Service and this Telehealth Consent. The patient was read the following statement and has consented to the visit as of 7/8/20. The patient has been scheduled for their first telehealth visit on 7/10/20 with Karly Francis CNP.

## 2020-07-10 ENCOUNTER — TELEPHONE (OUTPATIENT)
Dept: PULMONOLOGY | Age: 35
End: 2020-07-10

## 2020-07-10 NOTE — TELEPHONE ENCOUNTER
Noted
discussed with the patient including atelectasis, infection and prolonged mechanical ventilation. I recommend:  1- Airway extubation (if patient requires intubation for surgery) performed only when the patient is fully awake and alert. 2- Removal of the endotracheal tube should take place in the operating room or PACU. 3- The patient should be maintained in the semiupright or lateral, not supine, position and should undergo continuous cardiorespiratory monitoring. 4- Immediate application of CPAP with or without a nasopharyngeal airway will help prevent upper airway collapse. 5- Systemic opioids should be used cautiously because of their ability to depress the respiratory drive  6- Benzodiazepines should be avoided because of their negative effects on the respiratory control and upper airway musculature.

## 2020-07-10 NOTE — TELEPHONE ENCOUNTER
Patient NS to today's appt, I am not sure what 1st message is for. I recommend patient use CPAP as she has moderate SANTINO. Please offer patient appointment to discuss barriers to CPAP use/alternative treatments.

## 2020-07-10 NOTE — TELEPHONE ENCOUNTER
Pt had VV with Zaria Bah today but we were unable to get compliance report from [facility]. Pt needs to contact them to see if they can re-ping her modem or take her machine in for a download. Will watch for report and call patient back in 2 weeks if we have not received a CR.

## 2020-09-02 ENCOUNTER — VIRTUAL VISIT (OUTPATIENT)
Dept: PULMONOLOGY | Age: 35
End: 2020-09-02
Payer: COMMERCIAL

## 2020-09-02 PROCEDURE — 99213 OFFICE O/P EST LOW 20 MIN: CPT | Performed by: NURSE PRACTITIONER

## 2020-09-02 PROCEDURE — G8427 DOCREV CUR MEDS BY ELIG CLIN: HCPCS | Performed by: NURSE PRACTITIONER

## 2020-09-02 ASSESSMENT — SLEEP AND FATIGUE QUESTIONNAIRES
HOW LIKELY ARE YOU TO NOD OFF OR FALL ASLEEP IN A CAR, WHILE STOPPED FOR A FEW MINUTES IN TRAFFIC: 2
HOW LIKELY ARE YOU TO NOD OFF OR FALL ASLEEP WHILE SITTING AND TALKING TO SOMEONE: 2
HOW LIKELY ARE YOU TO NOD OFF OR FALL ASLEEP WHILE SITTING QUIETLY AFTER LUNCH WITHOUT ALCOHOL: 0
HOW LIKELY ARE YOU TO NOD OFF OR FALL ASLEEP WHILE WATCHING TV: 2
HOW LIKELY ARE YOU TO NOD OFF OR FALL ASLEEP WHILE LYING DOWN TO REST IN THE AFTERNOON WHEN CIRCUMSTANCES PERMIT: 2
ESS TOTAL SCORE: 11
HOW LIKELY ARE YOU TO NOD OFF OR FALL ASLEEP WHILE SITTING AND READING: 0
HOW LIKELY ARE YOU TO NOD OFF OR FALL ASLEEP WHEN YOU ARE A PASSENGER IN A CAR FOR AN HOUR WITHOUT A BREAK: 3
HOW LIKELY ARE YOU TO NOD OFF OR FALL ASLEEP WHILE SITTING INACTIVE IN A PUBLIC PLACE: 0

## 2020-09-02 NOTE — PATIENT INSTRUCTIONS
CPAP Equipment Cleaning and Disinfecting Schedule  Equipment Cleaning Frequency Instructions  Disinfecting Frequency   Non-Disposable Filters  Weekly Mild soapy water, Rinse, Air Dry Not Required   Disposable Filters Change as needed  2-4 weeks Do Not Wash Not Required   Hose/tubing Daily Mild soapy water, Rinse, Air Dry Once a week   Mask / Nasal Pillows Daily Mild soapy water, Rinse, Air Dry Once a week   Headgear Weekly Hand wash, Mild soapy water, Rinse, Dry  Not Required   Humidifier Daily Empty water daily  Mild soapy water, Rinse well, Air Dry  Once a week   CPAP Unit As Needed Dust with damp cloth,  No detergents or sprays Not Required         Disinfect (per schedule) with 1 part white vinegar and 3 parts water- soak mask and water chamber for 30 minutes every 1-2 weeks, more often if sick. Allow water/vinegar mixture to run through tubing. Allow all equipment to air dry. Drying Hints:   Always hang tubing away from direct sunlight, as this will cause the tubing to become yellow, brittle and crack over a period of time. DO NOT attach the wet tubing to your CPAP unit to blow-dry it. The moisture from the tubing can drain back into your machine. Moisture in your unit can cause sudden pressure increases or short circuits  DO's and DON'Ts:  - Don't use alcohol-based products to clean your mask, because it can cause the materials to become hard and brittle. - Don't put headgear in the washer or dryer  - Don't use any caustic or household cleaning solutions such as bleach on your CPAP   equipment.  - Do follow the recommended cleaning schedule. - Do change your disposable filter frequently. Adapted From: [employer]/cleaning. shtm. These are general suggestions for all models please follow specific s recommendations and specific instructions        Here are some tips to to getting better sleep  1- Avoid napping during the day: This will ensure you are tired at bedtime.  If you have to take a nap, sleep less than one hour, before 3 pm.   2- Exercise regularly, but not right before bed: but the timing of the workout is important. Exercising in the morning or early afternoon will not interfere with sleep. Exercising within two hours before bedtime can decrease your ability to fall asleep. Regular exercise is recommended to help you deepen the sleep. 3- Avoid heavy, spicy, or sugary foods 4-6 hours before bedtime: These can affect your ability to stay asleep. 4- Have a light snack before bed: Having an empty stomach can interfere with your sleep. Dairy products and turkey contain tryptophan, which acts as a natural sleep inducer. 5- Stay away from caffeine, nicotine and alcohol at least 4-6 hours before bed: Caffeine and nicotine are stimulants that interfere with your ability to fall asleep. While alcohol has an immediate sleep-inducing effect, a few hours later, as alcohol levels in your blood start to fall, there is a stimulant effect and you will experience fragmented sleep. 6- Take a hot bath 90 minutes before bedtime:  A hot bath will raise your body temperature, but it is the drop in body temperature that may leave you feeling sleepy  7- Develop sleep rituals: it is important to give your body cues that it is time to slow down and sleep. Listen to relaxing music, read something soothing for 15 minutes, have a cup of caffeine free tea, or do relaxation exercises such as yoga or deep breathing help relieve anxiety and reduce muscle tension. 8- Fix a bedtime and an awakening time: Even on weekends! When your sleep cycle has a regular rhythm, you will feel better. 9- Sleep only when sleepy: This reduces the time you are awake in bed. 10- Get into your favorite sleeping position: If you can't fall asleep within 15-30 minutes, get up and do something boring until you feel sleepy. Sit quietly in the dark or read the warranty on your refrigerator.  Don't expose yourself to

## 2020-09-02 NOTE — PROGRESS NOTES
Patient ID: Vidhya Moralez is a 38 y.o. female who is being seen today for   Chief Complaint   Patient presents with    Sleep Apnea     1 yr sleep         HPI:     Vidhya Moralez is a 38 y.o. female for televideo appointment via video and audio doxy. me virtual visit for SANTINO follow up. States she has not been using CPAP due to feels pressure is too low and it was spitting water at her. [place] like it was drowning her. STates she decreased humidification some but was still having same issue    Using humidifier. Occasional snoring on CPAP. The mask is not very comfortable- full face- feels it didn't fit right, too small. No mask leak. Some  daytime sleepiness. No nodding off when driving, can get sleepy. No dry nose or throat.  +fatigue. Bedtime is 9-10 pm and rise time is 9 am. Sleep onset is usually few minutes. Wakes up 0 times at night total. No nocturia. It takes unknown minutes to fall back a sleep. No naps during the day. +headache in am. No weight gain. 6-7 caffienated beverages during the day. No alcohol. Plans on bariatric weight loss surgery at some point.   ESS is 11      Sleep Medicine 9/2/2020 12/11/2018 11/7/2018 8/20/2018 5/14/2018   Sitting and reading 0 0 0 3 1   Watching TV 2 3 3 3 2   Sitting, inactive in a public place (e.g. a theatre or a meeting) 0 0 0 2 0   As a passenger in a car for an hour without a break 3 3 0 3 3   Lying down to rest in the afternoon when circumstances permit 2 2 3 1 1   Sitting and talking to someone 2 0 0 0 0   Sitting quietly after a lunch without alcohol 0 3 0 0 0   In a car, while stopped for a few minutes in traffic 2 0 0 0 0   Total score 11 11 6 12 7   Neck circumference - 14.75 17 17 16.75       Past Medical History:  Past Medical History:   Diagnosis Date    Anemia     Arthritis     Asthma     Back pain     Chronic kidney disease     proteinuria    Depression     bipolar depression-no meds    Macrosomia     history of in 2010 9lb 3 oz    Molar pregnancy     history of and also history of a partial molar pregnancy    Postpartum depression     baby blues after 2 deliveries    Proteinuria     Sleep apnea     cpap    Trichomonas infection 2016    history of    Vitamin D deficiency        Past Surgical History:        Procedure Laterality Date    ABDOMEN SURGERY      molar pregnancy      SECTION      CHOLECYSTECTOMY      DILATION AND CURETTAGE OF UTERUS  13    SUCTION    OTHER SURGICAL HISTORY  14    D & E    MN EGD INTRMURAL NEEDLE ASPIR/BIOP ALTERED ANATOMY N/A 10/8/2018    UPPER EUS/EGD NF W/ANES. performed by Bel Story DO at [place] [address] N/A 2018    EGD BIOPSY performed by Akbar Gupta MD at [place] [address] N/A 10/8/2018    EGD BIOPSY performed by Bel Story DO at [facility]       Allergies:  is allergic to sulfamethoxazole and oxycodone. Social History:    TOBACCO:   reports that she has never smoked. She has never used smokeless tobacco.  ETOH:   reports no history of alcohol use.     Family History:       Problem Relation Age of Onset    Arthritis Father     Alcohol Abuse Father     Diabetes Paternal Aunt     Mental Illness Paternal Aunt     Arthritis Paternal Aunt     Glaucoma Paternal Aunt     High Blood Pressure Paternal Grandmother     Stroke Paternal Grandmother     Diabetes Paternal Grandmother     High Blood Pressure Paternal Grandfather     Diabetes Paternal Grandfather     Alcohol Abuse Mother     Alcohol Abuse Maternal Aunt     Alcohol Abuse Maternal Grandmother        Current Medications:    Current Outpatient Medications:     Plecanatide (TRULANCE) 3 MG TABS, Take by mouth daily, Disp: , Rfl:     oxybutynin (DITROPAN-XL) 10 MG extended release tablet, , Disp: , Rfl:     hydrOXYzine (VISTARIL) 25 MG capsule, nightly , Disp: , Rfl:     propranolol (INDERAL) 20 MG tablet, 2 times daily , Disp: , Rfl:     medroxyPROGESTERone (DEPO-PROVERA) 150 MG/ML injection, Inject into the muscle, Disp: , Rfl:     ferrous sulfate 325 (65 Fe) MG tablet, Take 325 mg by mouth daily (with breakfast), Disp: , Rfl:     NONFORMULARY, Patient unsure of name, injected birth control, Disp: , Rfl:     albuterol (PROVENTIL HFA;VENTOLIN HFA) 108 (90 BASE) MCG/ACT inhaler, Inhale 2 puffs into the lungs every 6 hours as needed. , Disp: , Rfl:       Objective:   PHYSICAL EXAM:    There were no vitals taken for this visit. Physical exam:  Exam:  Gen: No acute distress, does not appear to be in pain. Appears well developed and nourished. HENT: Head is normocephalic and atraumatic. Normal appearing nose. External Ears normal.   Neck: No visualized mass. Trachea is midline   Eyes: EOM intact. No visible discharge. Resp:No visualized signs of difficulty breathing or respiratory distress, speaking in full sentences. Respiratory effort normal.  Neuro: Awake. Alert. Able to follow commands. No facial asymmetry. Psych: Oriented x 3. No anxiety. Normal affect. DATA:   6/21/2018 PSG AHI 25.1, low SPO2 82%  6/25/2018 titration-controlled sleep-related breathing with CPAP, recommendation CPAP 9 cm H2O. CPAP compliance data:  Compliance download report from 7/27/20 to 8/25/20 reviewed today by me and showed patient is using machine 1:23 hrs/night with 3% compliance and AHI 2.1 within this time frame. 1/30days with greater than 4 hours of machine use. 3/30 days with any CPAP use  90% pressure 12.6 cm H20 on auto CPAP 12-16 cm h2O      Assessment:      · Moderate SANTINO. CPAP 12-16 cm H2O. Poor compliance on review today  · Snoring-mild on CPAP  · Hypersomnia  · Morning headaches  · Obesity  Plan:       -Need to restart CPAP. Will send order to change to auto CPAP 11-15 cm H2O. -Treatment options were discussed with patient including CPAP therapy, oral appliances and upper airways surgery. Patient is in favor of CPAP. -Mask fitting at DME can try different mask for comfort/fit if needed  -Discussed severity of sleep apnea and importance of CPAP use  -Discussed how to adjust humidification settings. Can turn off if needed  - Advised to use CPAP 6-8 hrs at night and during naps. - Replacement of mask, tubing, head straps every 3-6 months or sooner if damaged. - Patient instructed to contact DME company for any mask, tubing or machine trouble shooting if problems arise.  - Sleep hygiene  - Avoid sedatives, alcohol and caffeinated drinks at bed time. - Patient counseled to never drive or operate heavy machinery while fatigue, drowsy or sleepy. - Weight loss is recommended as a long-term intervention.    - Complications of SANTINO if not treated were discussed with patient patient, including: systemic hypertension, pulmonary hypertension, cardiovascular morbidities, car accidents and all cause mortality.  -Patient education regarding sleep tips and CPAP cleaning recommendations     Follow up: 6 weeks, sooner if needed    Consent for telehealth visit was obtained and is noted in chart      C/ [address]. Claude Bond is a 38 y.o. female being evaluated by a Virtual Visit (video visit) encounter to address concerns as mentioned above. A caregiver was present when appropriate. Due to this being a TeleHealth encounter (During MTCXO-07 public health emergency), evaluation of the following organ systems was limited: Vitals/Constitutional/EENT/Resp/CV/GI//MS/Neuro/Skin/Heme-Lymph-Imm. Pursuant to the emergency declaration under the [facility], [facility] authority and the [employer] and [employer] Act, this Virtual Visit was conducted with patient's (and/or legal guardian's) consent, to reduce the patient's risk of exposure to COVID-19 and provide necessary medical care.   The patient (and/or legal guardian) has also been

## 2020-10-22 ENCOUNTER — VIRTUAL VISIT (OUTPATIENT)
Dept: PULMONOLOGY | Age: 35
End: 2020-10-22
Payer: COMMERCIAL

## 2020-10-22 PROCEDURE — G8427 DOCREV CUR MEDS BY ELIG CLIN: HCPCS | Performed by: NURSE PRACTITIONER

## 2020-10-22 PROCEDURE — 99213 OFFICE O/P EST LOW 20 MIN: CPT | Performed by: NURSE PRACTITIONER

## 2020-10-22 RX ORDER — BUPROPION HYDROCHLORIDE 150 MG/1
150 TABLET, EXTENDED RELEASE ORAL 2 TIMES DAILY
COMMUNITY
End: 2021-08-22

## 2020-10-22 RX ORDER — BUSPIRONE HYDROCHLORIDE 10 MG/1
10 TABLET ORAL 3 TIMES DAILY
COMMUNITY
End: 2021-08-22

## 2020-10-22 RX ORDER — MONTELUKAST SODIUM 10 MG/1
10 TABLET ORAL NIGHTLY
COMMUNITY
End: 2021-08-22

## 2020-10-22 RX ORDER — ZIPRASIDONE HYDROCHLORIDE 20 MG/1
20 CAPSULE ORAL 2 TIMES DAILY WITH MEALS
COMMUNITY
End: 2021-08-22

## 2020-10-22 ASSESSMENT — SLEEP AND FATIGUE QUESTIONNAIRES
HOW LIKELY ARE YOU TO NOD OFF OR FALL ASLEEP WHILE WATCHING TV: 1
HOW LIKELY ARE YOU TO NOD OFF OR FALL ASLEEP WHILE LYING DOWN TO REST IN THE AFTERNOON WHEN CIRCUMSTANCES PERMIT: 1
ESS TOTAL SCORE: 3
HOW LIKELY ARE YOU TO NOD OFF OR FALL ASLEEP WHEN YOU ARE A PASSENGER IN A CAR FOR AN HOUR WITHOUT A BREAK: 1
HOW LIKELY ARE YOU TO NOD OFF OR FALL ASLEEP WHILE SITTING AND TALKING TO SOMEONE: 0
HOW LIKELY ARE YOU TO NOD OFF OR FALL ASLEEP WHILE SITTING QUIETLY AFTER LUNCH WITHOUT ALCOHOL: 0
HOW LIKELY ARE YOU TO NOD OFF OR FALL ASLEEP IN A CAR, WHILE STOPPED FOR A FEW MINUTES IN TRAFFIC: 0
HOW LIKELY ARE YOU TO NOD OFF OR FALL ASLEEP WHILE SITTING AND READING: 0
HOW LIKELY ARE YOU TO NOD OFF OR FALL ASLEEP WHILE SITTING INACTIVE IN A PUBLIC PLACE: 0

## 2020-10-22 NOTE — PATIENT INSTRUCTIONS
Sleep Hygiene. .. Tips for better sleep. .. Avoid naps. This will ensure you are sleepy at bedtime. If you have to take a nap, sleep less than 1 hour, before 3 pm.  Sleep only when sleepy; this reduces the time you are awake in bed. Regular exercise is recommended to help you deepen your sleep, but not within 4-6 hours of your bedtime. Timing of exercise is important, aim to exercise early in the morning or early afternoon. A light snack may help you fall asleep. Warm milk and foods high in the amino acid tryptophan, such as bananas, may help you to sleep  Be sure to avoid heavy, spicy or sugary foods 4-6 hours before bedtime and avoid at snack time. Stay away from stimulants such as caffeine and nicotine for at least 4-6 hours before bed. Stimulants can interfere with your ability to fall asleep. Caffeine is found in tea, cola, coffee, cocoa and chocolate and is best avoided at bedtime. Nicotine is found in tobacco products. Avoid alcohol 4-6 hours before bedtime. Alcohol has an immediate sleep-inducing effect, after a few hours when alcohol levels fall there is a stimulant or wake-up effect and will cause fragmented sleep. Sleep rituals are important. Give your body clues it is time to slow down and sleep. Examples include; yoga, deep breathing, listen to relaxing music, a hot bath or a few minutes of reading. Have a fixed bedtime and awakening time, Even on weekends! You will feel better keeping a regular sleep cycle, even if you are retired or not working. Get into your favorite sleep position. If not asleep in 30 minutes, get up and do something boring until you feel sleepy. Remember not to expose yourself to bright lights such as TV, phone or tablet screens. Only use your bed for sleeping. Do not use your bed as an office, workroom or recreation room. Use comfortable bedding. Uncomfortable bedding can prevent good sleep. Ensure your bedroom is quiet and comfortable.  A cooler room along with recommended cleaning schedule. - Do change your disposable filter frequently. Adapted From: [email or web address]/cleaning. shtm.   These are general suggestions for all models please follow specific s recommendations and specific instructions

## 2020-12-16 ENCOUNTER — VIRTUAL VISIT (OUTPATIENT)
Dept: PULMONOLOGY | Age: 35
End: 2020-12-16
Payer: COMMERCIAL

## 2020-12-16 PROCEDURE — G8427 DOCREV CUR MEDS BY ELIG CLIN: HCPCS | Performed by: NURSE PRACTITIONER

## 2020-12-16 PROCEDURE — 99213 OFFICE O/P EST LOW 20 MIN: CPT | Performed by: NURSE PRACTITIONER

## 2020-12-16 ASSESSMENT — SLEEP AND FATIGUE QUESTIONNAIRES
ESS TOTAL SCORE: 12
HOW LIKELY ARE YOU TO NOD OFF OR FALL ASLEEP WHILE SITTING AND TALKING TO SOMEONE: 2
HOW LIKELY ARE YOU TO NOD OFF OR FALL ASLEEP WHILE SITTING QUIETLY AFTER LUNCH WITHOUT ALCOHOL: 0
HOW LIKELY ARE YOU TO NOD OFF OR FALL ASLEEP WHILE SITTING AND READING: 2
HOW LIKELY ARE YOU TO NOD OFF OR FALL ASLEEP WHILE SITTING INACTIVE IN A PUBLIC PLACE: 2
HOW LIKELY ARE YOU TO NOD OFF OR FALL ASLEEP WHEN YOU ARE A PASSENGER IN A CAR FOR AN HOUR WITHOUT A BREAK: 2
HOW LIKELY ARE YOU TO NOD OFF OR FALL ASLEEP WHILE WATCHING TV: 2
HOW LIKELY ARE YOU TO NOD OFF OR FALL ASLEEP WHILE LYING DOWN TO REST IN THE AFTERNOON WHEN CIRCUMSTANCES PERMIT: 2
HOW LIKELY ARE YOU TO NOD OFF OR FALL ASLEEP IN A CAR, WHILE STOPPED FOR A FEW MINUTES IN TRAFFIC: 0

## 2020-12-16 NOTE — PATIENT INSTRUCTIONS

## 2020-12-16 NOTE — PROGRESS NOTES
Sal Carlisle is a 38 y.o. female for televideo appointment via video and audio doxy. me virtual visit for SANTINO follow up. States she has not been using CPAP due to feels pressure is too low and it was spitting water at her. Melvin like it was drowning her. STates she decreased humidification some but was still having same issue    Using humidifier. Occasional snoring on CPAP. The mask is not very comfortable- full face- feels it didn't fit right, too small. No mask leak. Some  daytime sleepiness. No nodding off when driving, can get sleepy. No dry nose or throat.  +fatigue. Bedtime is 9-10 pm and rise time is 9 am. Sleep onset is usually few minutes. Wakes up 0 times at night total. No nocturia. It takes unknown minutes to fall back a sleep. No naps during the day. +headache in am. No weight gain. 6-7 caffienated beverages during the day. No alcohol. Plans on bariatric weight loss surgery at some point.   ESS is 11      Sleep Medicine 12/16/2020 10/22/2020 9/2/2020 12/11/2018 11/7/2018 8/20/2018 5/14/2018   Sitting and reading 2 0 0 0 0 3 1   Watching TV 2 1 2 3 3 3 2   Sitting, inactive in a public place (e.g. a theatre or a meeting) 2 0 0 0 0 2 0   As a passenger in a car for an hour without a break 2 1 3 3 0 3 3   Lying down to rest in the afternoon when circumstances permit 2 1 2 2 3 1 1   Sitting and talking to someone 2 0 2 0 0 0 0   Sitting quietly after a lunch without alcohol 0 0 0 3 0 0 0   In a car, while stopped for a few minutes in traffic 0 0 2 0 0 0 0   Total score 12 3 11 11 6 12 7   Neck circumference - - - 14.75 17 17 16.75       Past Medical History:  Past Medical History:   Diagnosis Date    Anemia     Arthritis     Asthma     Back pain     Chronic kidney disease     proteinuria    Depression     bipolar depression-no meds    Macrosomia     history of in 2010 9lb 3 oz    Molar pregnancy     history of and also history of a partial molar pregnancy    Postpartum depression   ferrous sulfate 325 (65 Fe) MG tablet, Take 325 mg by mouth daily (with breakfast), Disp: , Rfl:     albuterol (PROVENTIL HFA;VENTOLIN HFA) 108 (90 BASE) MCG/ACT inhaler, Inhale 2 puffs into the lungs every 6 hours as needed. , Disp: , Rfl:     ziprasidone (GEODON) 20 MG capsule, Take 20 mg by mouth 2 times daily (with meals), Disp: , Rfl:     Plecanatide (TRULANCE) 3 MG TABS, Take by mouth daily, Disp: , Rfl:     oxybutynin (DITROPAN-XL) 10 MG extended release tablet, , Disp: , Rfl:     propranolol (INDERAL) 20 MG tablet, 2 times daily , Disp: , Rfl:     medroxyPROGESTERone (DEPO-PROVERA) 150 MG/ML injection, Inject into the muscle, Disp: , Rfl:     NONFORMULARY, Patient unsure of name, injected birth control, Disp: , Rfl:       Objective:   PHYSICAL EXAM:    There were no vitals taken for this visit. Physical exam:  Exam:  Gen: No acute distress, does not appear to be in pain. Appears well developed and nourished. HENT: Head is normocephalic and atraumatic. Normal appearing nose. External Ears normal.   Neck: No visualized mass. Trachea is midline   Eyes: EOM intact. No visible discharge. Resp:No visualized signs of difficulty breathing or respiratory distress, speaking in full sentences. Respiratory effort normal.  Neuro: Awake. Alert. Able to follow commands. No facial asymmetry. Psych: Oriented x 3. No anxiety. Normal affect. DATA:   6/21/2018 PSG AHI 25.1, low SPO2 82%  6/25/2018 titrationcontrolled sleep-related breathing with CPAP, recommendation CPAP 9 cm H2O. CPAP compliance data:  Compliance download report from 7/27/20 to 8/25/20 reviewed today by me and showed patient is using machine 1:23 hrs/night with 3% compliance and AHI 2.1 within this time frame. 1/30days with greater than 4 hours of machine use.  3/30 days with any CPAP use  90% pressure 12.6 cm H20 on auto CPAP 12-16 cm h2O    10/22/2020no CPAP use in past 30+ days Compliance download report from 10/12/20 to 11/10/20 reviewed today by me and showed patient is using machine 2:22 hrs/night with 3% compliance and AHI 3.2 within this time frame. 1/30days with greater than 4 hours of machine use.6/30 days with any CPAP use  90% pressure 13 cm H20 on auto CPAP 11-15 cm H2O     Assessment:      · Moderate SANTINO. CPAP 11-15 cm H2O. Poor compliance on review today  · Snoring-mild on CPAP  · Hypersomnia  · Morning headaches  · Obesity  Plan:       -Need to restart CPAP. Continue auto CPAP 11-15 cm H2O to DME. -Treatment options were discussed with patient including CPAP therapy, oral appliances and upper airways surgery. Patient is in favor of continueing CPAP. -Discussed CPAP acclamation techniques  -Consider BiPAP titration if no improvement  -Discussed severity of sleep apnea and importance of CPAP use  -Discussed how to adjust humidification settings. Can turn off if needed  - Advised to use CPAP 6-8 hrs at night and during naps. - Replacement of mask, tubing, head straps every 3-6 months or sooner if damaged. - Patient instructed to contact "[employer] company for any mask, tubing or machine trouble shooting if problems arise.  - Sleep hygiene  - Avoid sedatives, alcohol and caffeinated drinks at bed time. - Patient counseled to never drive or operate heavy machinery while fatigue, drowsy or sleepy. - Weight loss is recommended as a long-term intervention.    - Complications of SANTINO if not treated were discussed with patient patient, including: systemic hypertension, pulmonary hypertension, cardiovascular morbidities, car accidents and all cause mortality.  -Patient education regarding sleep tips and CPAP cleaning recommendations     Follow up: 6 weeks, sooner if needed    Consent for telehealth visit was obtained and is noted in chart      C/ [address]. ME Jocelyne Jordan is a 38 y.o. female being evaluated by a Virtual Visit (video visit) encounter to address concerns as mentioned above. A caregiver was present when appropriate. Due to this being a TeleHealth encounter (During [facility]- public health emergency), evaluation of the following organ systems was limited: Vitals/Constitutional/EENT/Resp/CV/GI//MS/Neuro/Skin/Heme-Lymph-Imm. Pursuant to the emergency declaration under the [address] [place] [address], [address] [place] [address] and the [employer] Act, this Virtual Visit was conducted with patient's (and/or legal guardian's) consent, to reduce the patient's risk of exposure to COVID-19 and provide necessary medical care. The patient (and/or legal guardian) has also been advised to contact this office for worsening conditions or problems, and seek emergency medical treatment and/or call 911 if deemed necessary. Patient identification was verified at the start of the visit: Yes      Services were provided through a video synchronous discussion virtually to substitute for in-person clinic visit. Patient and provider were located at their individual homes. --ELIAS Dudley CNP on 12/16/2020 at 10:06 PM    An electronic signature was used to authenticate this note.

## 2021-01-27 ENCOUNTER — TELEPHONE (OUTPATIENT)
Dept: PULMONOLOGY | Age: 36
End: 2021-01-27

## 2021-01-27 NOTE — TELEPHONE ENCOUNTER
Patient did not show for 6 week compliance appointment  with Jane Concepcion on 1/27/21    Same Day Cancellation: Yes  Called pt to check her in for appointment today and she stated she is changing doctors and offices and did not want this appointment. She would not say what provider she is changing to    Patient rescheduled:  No      Patient was also no show on: 7/10/2020    LOV 12/16/2020    Assessment:       · Moderate SANTINO. CPAP 11-15 cm H2O. Poor compliance on review today  · Snoring-mild on CPAP  · Hypersomnia  · Morning headaches  · Obesity  Plan:       -Need to restart CPAP. Continue auto CPAP 11-15 cm H2O to DME. -Treatment options were discussed with patient including CPAP therapy, oral appliances and upper airways surgery. Patient is in favor of continueing CPAP. -Discussed CPAP acclamation techniques  -Consider BiPAP titration if no improvement  -Discussed severity of sleep apnea and importance of CPAP use  -Discussed how to adjust humidification settings. Can turn off if needed  - Advised to use CPAP 6-8 hrs at night and during naps. - Replacement of mask, tubing, head straps every 3-6 months or sooner if damaged. - Patient instructed to contact DME company for any mask, tubing or machine trouble shooting if problems arise.  - Sleep hygiene  - Avoid sedatives, alcohol and caffeinated drinks at bed time. - Patient counseled to never drive or operate heavy machinery while fatigue, drowsy or sleepy.    - Weight loss is recommended as a long-term intervention.    - Complications of SANTINO if not treated were discussed with patient patient, including: systemic hypertension, pulmonary hypertension, cardiovascular morbidities, car accidents and all cause mortality.  -Patient education regarding sleep tips and CPAP cleaning recommendations      Follow up: 6 weeks, sooner if needed

## 2021-06-15 NOTE — TELEPHONE ENCOUNTER
Patient cancelled appointment on 12/2/19 with Dulce [facility] for 1 year sleep follow-up. Reason: did not give one    Patient did reschedule appointment. Appointment rescheduled for 3/23/20. Last OV   Assessment: 12/11/18 Calli Cloud CNP OV note      · Moderate SANTINO - CPAP 9cmH2O. Full face mask. Optimal compliance with optimal efficacy upon review today. · Snoring - resolved with CPAP   · Observed Sleep Apnea - resolved with CPAP   · Dyspnea - PFTs unremarkable. No obstructive, or restrictive defect. No bronchodilator response. Normal diffusion capacity. Deconditioning and obesity contributing   · Preoperative pulmonary clearance for bariatric surgery, gastric bypass with Dr. Adarsh Hay at [facility]. Date to be determined. · Morbid Obesity - BMI 50.24  ·    Plan:       · CPAP [id number]  · Request download compliance   · Advised to use CPAP 6-8 hrs at night and during naps. · Request SD card from DME company   · Replacement of mask, tubing, head straps every 3-6 months or sooner if damaged. · Discussed acclimation techniques at great length today   · Patient instructed to contact [employer] for any mask, tubing or machine trouble shooting if problems arise. · Sleep hygiene discussed along with written education in AVS  · Avoid sedatives, alcohol and caffeinated drinks at bed time. · Patient counseled to never drive or operate heavy machinery while fatigue, drowsy or sleepy. · Weight loss is also recommended as a long-term intervention. · Complications of SANTINO if not treated were discussed with the patient to including: systemic hypertension, pulmonary hypertension, cardiovascular morbidities, car accidents and all cause mortality.     Upon examination and discussion I have determined that, from a pulmonary standpoint, patient is clear for surgery. Patient has low preoperative pulmonary risk with 1.9% pulmonary complication rate.  Perioperative pulmonary complications were discussed with the patient including atelectasis, infection and prolonged mechanical ventilation. I recommend:  1- Airway extubation (if patient requires intubation for surgery) performed only when the patient is fully awake and alert. 2- Removal of the endotracheal tube should take place in the operating room or PACU. 3- The patient should be maintained in the semiupright or lateral, not supine, position and should undergo continuous cardiorespiratory monitoring. 4- Immediate application of CPAP with or without a nasopharyngeal airway will help prevent upper airway collapse. 5- Systemic opioids should be used cautiously because of their ability to depress the respiratory drive  6- Benzodiazepines should be avoided because of their negative effects on the respiratory control and upper airway musculature. Hide Additional Notes?: No Detail Level: Generalized Detail Level: Zone

## 2021-08-22 ENCOUNTER — APPOINTMENT (OUTPATIENT)
Dept: GENERAL RADIOLOGY | Age: 36
End: 2021-08-22
Payer: COMMERCIAL

## 2021-08-22 ENCOUNTER — HOSPITAL ENCOUNTER (EMERGENCY)
Age: 36
Discharge: HOME | End: 2021-08-22
Attending: STUDENT IN AN ORGANIZED HEALTH CARE EDUCATION/TRAINING PROGRAM
Payer: COMMERCIAL

## 2021-08-22 VITALS
HEART RATE: 90 BPM | DIASTOLIC BLOOD PRESSURE: 75 MMHG | RESPIRATION RATE: 18 BRPM | BODY MASS INDEX: 45.99 KG/M2 | OXYGEN SATURATION: 99 % | TEMPERATURE: 98.3 F | HEIGHT: 67 IN | WEIGHT: 293 LBS | SYSTOLIC BLOOD PRESSURE: 120 MMHG

## 2021-08-22 DIAGNOSIS — M79.602 PARESTHESIA AND PAIN OF BOTH UPPER EXTREMITIES: Primary | ICD-10-CM

## 2021-08-22 DIAGNOSIS — M79.601 PARESTHESIA AND PAIN OF BOTH UPPER EXTREMITIES: Primary | ICD-10-CM

## 2021-08-22 DIAGNOSIS — R11.2 NON-INTRACTABLE VOMITING WITH NAUSEA, UNSPECIFIED VOMITING TYPE: ICD-10-CM

## 2021-08-22 DIAGNOSIS — R20.2 PARESTHESIA AND PAIN OF BOTH UPPER EXTREMITIES: Primary | ICD-10-CM

## 2021-08-22 LAB
A/G RATIO: 1.1 (ref 1.1–2.2)
ALBUMIN SERPL-MCNC: 3.9 G/DL (ref 3.4–5)
ALP BLD-CCNC: 108 U/L (ref 40–129)
ALT SERPL-CCNC: 13 U/L (ref 10–40)
ANION GAP SERPL CALCULATED.3IONS-SCNC: 10 MMOL/L (ref 3–16)
AST SERPL-CCNC: 13 U/L (ref 15–37)
BASOPHILS ABSOLUTE: 0.1 K/UL (ref 0–0.2)
BASOPHILS RELATIVE PERCENT: 1.3 %
BILIRUB SERPL-MCNC: <0.2 MG/DL (ref 0–1)
BUN BLDV-MCNC: 8 MG/DL (ref 7–20)
CALCIUM SERPL-MCNC: 9.5 MG/DL (ref 8.3–10.6)
CHLORIDE BLD-SCNC: 103 MMOL/L (ref 99–110)
CO2: 26 MMOL/L (ref 21–32)
CREAT SERPL-MCNC: 0.6 MG/DL (ref 0.6–1.1)
EOSINOPHILS ABSOLUTE: 0.2 K/UL (ref 0–0.6)
EOSINOPHILS RELATIVE PERCENT: 1.7 %
GFR AFRICAN AMERICAN: >60
GFR NON-AFRICAN AMERICAN: >60
GLOBULIN: 3.5 G/DL
GLUCOSE BLD-MCNC: 97 MG/DL (ref 70–99)
HCG QUALITATIVE: NEGATIVE
HCT VFR BLD CALC: 38.8 % (ref 36–48)
HEMOGLOBIN: 12.8 G/DL (ref 12–16)
LIPASE: 26 U/L (ref 13–60)
LYMPHOCYTES ABSOLUTE: 2.9 K/UL (ref 1–5.1)
LYMPHOCYTES RELATIVE PERCENT: 28.1 %
MCH RBC QN AUTO: 25.9 PG (ref 26–34)
MCHC RBC AUTO-ENTMCNC: 32.9 G/DL (ref 31–36)
MCV RBC AUTO: 78.8 FL (ref 80–100)
MONOCYTES ABSOLUTE: 0.6 K/UL (ref 0–1.3)
MONOCYTES RELATIVE PERCENT: 5.5 %
NEUTROPHILS ABSOLUTE: 6.6 K/UL (ref 1.7–7.7)
NEUTROPHILS RELATIVE PERCENT: 63.4 %
PDW BLD-RTO: 14.9 % (ref 12.4–15.4)
PLATELET # BLD: 258 K/UL (ref 135–450)
PMV BLD AUTO: 8.5 FL (ref 5–10.5)
POTASSIUM REFLEX MAGNESIUM: 4.3 MMOL/L (ref 3.5–5.1)
RBC # BLD: 4.93 M/UL (ref 4–5.2)
SODIUM BLD-SCNC: 139 MMOL/L (ref 136–145)
TOTAL PROTEIN: 7.4 G/DL (ref 6.4–8.2)
WBC # BLD: 10.4 K/UL (ref 4–11)

## 2021-08-22 PROCEDURE — 85025 COMPLETE CBC W/AUTO DIFF WBC: CPT

## 2021-08-22 PROCEDURE — 99283 EMERGENCY DEPT VISIT LOW MDM: CPT

## 2021-08-22 PROCEDURE — 82607 VITAMIN B-12: CPT

## 2021-08-22 PROCEDURE — 36415 COLL VENOUS BLD VENIPUNCTURE: CPT

## 2021-08-22 PROCEDURE — 84703 CHORIONIC GONADOTROPIN ASSAY: CPT

## 2021-08-22 PROCEDURE — 83690 ASSAY OF LIPASE: CPT

## 2021-08-22 PROCEDURE — 72040 X-RAY EXAM NECK SPINE 2-3 VW: CPT

## 2021-08-22 PROCEDURE — 80053 COMPREHEN METABOLIC PANEL: CPT

## 2021-08-22 PROCEDURE — 82746 ASSAY OF FOLIC ACID SERUM: CPT

## 2021-08-22 RX ORDER — ONDANSETRON 4 MG/1
4 TABLET, ORALLY DISINTEGRATING ORAL EVERY 8 HOURS PRN
Qty: 21 TABLET | Refills: 0 | Status: SHIPPED | OUTPATIENT
Start: 2021-08-22

## 2021-08-22 NOTE — ED PROVIDER NOTES
[facility]      CHIEF COMPLAINT  Numbness and vomiting     HISTORY OF PRESENT ILLNESS  Sal Carlisle is a 43 y.o. female with a past medical history of asthma, obesity, SANTINO, hyperlipidemia, and anemia, who presents to the ED complaining of multiple complaints. Patient reports Parasthesias in bilateral hands for 1m. Intermittent pins and needles pain. Initially intermittent, more persistent now. Starting to go up her arm bilaterally. Both sides symptoms equal. Seen at [facility] ~1m ago when symptoms started, states that there was nothing to do- she states that they did not do any tests. Denies known DM. Denies trauma, new headache, weakness, vision changes, slurred speech. Denies neck pain. Denies chemical exposure. Denies family history of neurodegenerative orders. Reports intermittent nausea and vomiting for 1m. Reports it appears to associated with the smell of things. Denies difficulty swallowing. Denies abdominal pain or chest pain. States she is unsure if she could be pregnant. Back pain Red Flags:   IV drug abuse? No   Fever without source? No   Systemic illness? No   Immunosuppressed? No   Recent surgery/procedure? No   Incontinence or retention? No   Indwelling hilton? No   Alcohol abuse? No      Diabetes? No   Night pain? No   3rd visit in 20 days? No   Renal failure? No   Total: 0     Old records reviewed: No pertinent information noted. No other complaints, modifying factors or associated symptoms. I have reviewed the following from the nursing documentation.     Past Medical History:   Diagnosis Date    Anemia     Arthritis     Asthma     Back pain     Chronic kidney disease     proteinuria    Depression     bipolar depression-no meds    Macrosomia     history of in 2010 9lb 3 oz    Molar pregnancy     history of and also history of a partial molar pregnancy    Postpartum depression     baby blues after 2 deliveries    Proteinuria     Sleep apnea cpap    Trichomonas infection 2016    history of    Vitamin D deficiency      Past Surgical History:   Procedure Laterality Date    ABDOMEN SURGERY      molar pregnancy      SECTION      CHOLECYSTECTOMY      DILATION AND CURETTAGE OF UTERUS  13    SUCTION    OTHER SURGICAL HISTORY  14    D & E    SD EGD INTRMURAL NEEDLE ASPIR/BIOP ALTERED ANATOMY N/A 10/8/2018    UPPER EUS/EGD NF W/ANES.  performed by Cedrick Villalba DO at [address] [place] [address] N/A 2018    EGD BIOPSY performed by Heather Bangura MD at [facility] N/A 10/8/2018    EGD BIOPSY performed by Cedrick Villalba DO at [facility]     Family History   Problem Relation Age of Onset    Arthritis Father     Alcohol Abuse Father     Diabetes Paternal Aunt     Mental Illness Paternal Aunt     Arthritis Paternal Aunt     Glaucoma Paternal Aunt     High Blood Pressure Paternal Grandmother     Stroke Paternal Grandmother     Diabetes Paternal Grandmother     High Blood Pressure Paternal Grandfather     Diabetes Paternal Grandfather     Alcohol Abuse Mother     Alcohol Abuse Maternal Aunt     Alcohol Abuse Maternal Grandmother      Social History     Socioeconomic History    Marital status:      Spouse name: Not on file    Number of children: Not on file    Years of education: Not on file    Highest education level: Not on file   Occupational History    Not on file   Tobacco Use    Smoking status: Never Smoker    Smokeless tobacco: Never Used   Vaping Use    Vaping Use: Never used   Substance and Sexual Activity    Alcohol use: No    Drug use: No    Sexual activity: Not Currently   Other Topics Concern    Not on file   Social History Narrative    Not on file     Social Determinants of Health     Financial Resource Strain:     Difficulty of Paying Living Expenses:    Food Insecurity:     Worried tender to palpation. Radial pulses intact bilaterally, intact capillary refill in the upper extremities bilaterally. LUNGS: Respirations unlabored. CTAB. Good air exchange. Speaking comfortably in full sentences. ABDOMEN: No tenderness. Soft. Non-distended. No masses. No organomegaly. No guarding or rebound. MUSCULOSKELETAL: No extremity edema. Compartments soft. No deformity. No tenderness in the extremities. All extremities neurovascularly intact. SKIN: Warm and dry. No acute rashes. NEUROLOGICAL: Alert and oriented. CN's 2-12 intact. No gross facial drooping. Strength 5/5 in all extremities with decreased sensation in the left lower extremity that patient reports is baseline since previous knee injury, sensation intact, patient does report normal sensation in her bilateral upper extremities, just also reports an underlying pins and needle feeling. NIH stroke scale of 0. GCS 15.  Cardinal movements of the upper extremities intact. PSYCHIATRIC: Normal mood and affect. LABS  I have reviewed all labs for this visit.    Results for orders placed or performed during the hospital encounter of 08/22/21   CBC Auto Differential   Result Value Ref Range    WBC 10.4 4.0 - 11.0 K/uL    RBC 4.93 4.00 - 5.20 M/uL    Hemoglobin 12.8 12.0 - 16.0 g/dL    Hematocrit 38.8 36.0 - 48.0 %    MCV 78.8 (L) 80.0 - 100.0 fL    MCH 25.9 (L) 26.0 - 34.0 pg    MCHC 32.9 31.0 - 36.0 g/dL    RDW 14.9 12.4 - 15.4 %    Platelets 556 245 - 421 K/uL    MPV 8.5 5.0 - 10.5 fL    Neutrophils % 63.4 %    Lymphocytes % 28.1 %    Monocytes % 5.5 %    Eosinophils % 1.7 %    Basophils % 1.3 %    Neutrophils Absolute 6.6 1.7 - 7.7 K/uL    Lymphocytes Absolute 2.9 1.0 - 5.1 K/uL    Monocytes Absolute 0.6 0.0 - 1.3 K/uL    Eosinophils Absolute 0.2 0.0 - 0.6 K/uL    Basophils Absolute 0.1 0.0 - 0.2 K/uL   Comprehensive Metabolic Panel w/ Reflex to MG   Result Value Ref Range    Sodium 139 136 - 145 mmol/L    Potassium reflex Magnesium 4.3 3.5 - 5.1 mmol/L    Chloride 103 99 - 110 mmol/L    CO2 26 21 - 32 mmol/L    Anion Gap 10 3 - 16    Glucose 97 70 - 99 mg/dL    BUN 8 7 - 20 mg/dL    CREATININE 0.6 0.6 - 1.1 mg/dL    GFR Non-African American >60 >60    GFR African American >60 >60    Calcium 9.5 8.3 - 10.6 mg/dL    Total Protein 7.4 6.4 - 8.2 g/dL    Albumin 3.9 3.4 - 5.0 g/dL    Albumin/Globulin Ratio 1.1 1.1 - 2.2    Total Bilirubin <0.2 0.0 - 1.0 mg/dL    Alkaline Phosphatase 108 40 - 129 U/L    ALT 13 10 - 40 U/L    AST 13 (L) 15 - 37 U/L    Globulin 3.5 g/dL   Lipase   Result Value Ref Range    Lipase 26.0 13.0 - 60.0 U/L   HCG Qualitative, Serum   Result Value Ref Range    hCG Qual Negative Detects HCG level >10 MIU/mL     RADIOLOGY    XR CERVICAL SPINE (2-3 VIEWS)   Final Result   Normal appearing cervical spine with straight alignment. No acute   abnormality. RECOMMENDATION:   MRI suggested for evaluation of the patient's symptoms. ED COURSE / MDM  Patient seen and evaluated. Old records reviewed and pertinent information included in HPI. Labs and imaging reviewed and results discussed with patient. Overall well appearing patient, in no acute distress, presenting for multiple complaints including bilateral paresthesias in upper extremities and vomiting. Physical exam remarkable for intact neurologic exam.     Differential diagnosis includes but is not limited to: Cervical radiculopathy, neuropathy, spinal stenosis, sciatica, disc herniation, degenerative joint disease, kidney stone, pyelonephritis, low suspicion for spinal fracture, epidural abscess, cauda equina, AAA, aortic dissection  Pregnancy, pancreatitis, electrolyte abnormality, hepatitis, gastritis    Laboratory studies and imaging obtained. Workup showed: Workup showed:  ED Course as of Aug 23 0208   Corder Montgomery Aug 22, 2021   2033 Lipase within normal limits, low suspicion for pancreatitis.     [ER]   2033 Patient is not pregnant.    [ER]   2033 No electrolyte abnormalities or evidence of kidney dysfunction.    [ER]   2033 Liver function testing unremarkable. [ER]   2034 No leukocytosis, anemia, thrombocytopenia. [ER]   2153 XR C spine: IMPRESSION:  Normal appearing cervical spine with straight alignment. No acute  abnormality.     RECOMMENDATION:  MRI suggested for evaluation of the patient's symptoms. [ER]   2157 Discussed with patient, she would prefer discharge to follow-up on an outpatient basis. [ER]      ED Course User Index  [ER] Messi Esquivel MD      Patient denies any back pain. However, she does report bilateral upper extremity paresthesias. No neurologic deficits identified on exam.  She denies any trauma and has no C-spine tenderness to palpation. Patient has not had fever. No managements on exam.  Symptoms somewhat consistent with cervical radiculopathy, however they are bilateral. I completed a structured, evidence-based clinical evaluation to screen for acute non-traumatic spinal emergencies. The patient has a normal detailed neurologic exam and a low red flag score. The evidence indicates that the patient is very low risk for an acute spinal emergency and this is consistent with my clinical intuition. The risk of further workup is higher than the likelihood of the patient having a spinal epidural abscess or other dangerous emergency spinal condition. X-ray of the cervical spine shows normal-appearing spine. Radiology did recommend an MRI, and I agree that this is appropriate to further evaluate the cervical spine. I discussed possible admission with the patient for MRI and neurology evaluation. Patient states that given her symptoms have been present for 1 month, her preference would be outpatient follow-up. Given that patient does not have apparent neurologic deficits on exam and at this time there are no red flag symptoms, I feel that it is patient's preference for outpatient management this is reasonable.   Consider this an informed discharge. We discussed very strict return precautions including any worsening or spreading of symptoms. Patient given orthopedics and neurology referral.  We discussed that she needed an urgent MRI on an outpatient basis. Patient endorses understanding. Regarding vomiting, work-up overall reassuring. Exact etiology unclear at this time. She states that vomiting is brought on by smelling certain and scents. Her work-up is unremarkable. Recommended PCP follow-up. Patient was provided with prescriptions for Zofran. Return precautions given. Very strict return precautions given. Patient encouraged to follow-up with PCP, neurology, and orthopedics. Patient discharged in stable condition. CLINICAL IMPRESSION  1. Paresthesia and pain of both upper extremities    2. Non-intractable vomiting with nausea, unspecified vomiting type        Blood pressure 120/75, pulse 90, temperature 98.3 °F (36.8 °C), temperature source Oral, resp. rate 18, height 5' 7\" (1.702 m), weight 300 lb (136.1 kg), SpO2 99 %, not currently breastfeeding. Perfecto Rojas was discharged to home in stable condition. Patient was given scripts for the following medications. I counseled patient how to take these medications. Discharge Medication List as of 8/22/2021 10:00 PM      START taking these medications    Details   ondansetron (ZOFRAN ODT) 4 MG disintegrating tablet Take 1 tablet by mouth every 8 hours as needed for Nausea or Vomiting, Disp-21 tablet, R-0Normal             Follow-up with:  [facility]) Pre-Services  [phone]        [id number] Professor Terrance Chou [facility] [address] [address]  [phone]            DISCLAIMER: This chart was created using Dragon dictation software.   Efforts were made by me to ensure accuracy, however some errors may be present due to limitations of this technology and occasionally words are not transcribed correctly.          Lenin Cerna MD  08/23/21 0730

## 2021-08-23 LAB
FOLATE: 8.25 NG/ML (ref 4.78–24.2)
VITAMIN B-12: 460 PG/ML (ref 211–911)

## 2021-09-07 ENCOUNTER — HOSPITAL ENCOUNTER (EMERGENCY)
Age: 36
Discharge: HOME | End: 2021-09-07
Attending: EMERGENCY MEDICINE
Payer: COMMERCIAL

## 2021-09-07 VITALS
HEIGHT: 67 IN | OXYGEN SATURATION: 99 % | DIASTOLIC BLOOD PRESSURE: 92 MMHG | HEART RATE: 89 BPM | WEIGHT: 293 LBS | SYSTOLIC BLOOD PRESSURE: 135 MMHG | RESPIRATION RATE: 18 BRPM | TEMPERATURE: 99.1 F | BODY MASS INDEX: 45.99 KG/M2

## 2021-09-07 DIAGNOSIS — J02.9 ACUTE PHARYNGITIS, UNSPECIFIED ETIOLOGY: Primary | ICD-10-CM

## 2021-09-07 DIAGNOSIS — Z87.09 HISTORY OF ASTHMA: ICD-10-CM

## 2021-09-07 LAB — S PYO AG THROAT QL: NEGATIVE

## 2021-09-07 PROCEDURE — 87880 STREP A ASSAY W/OPTIC: CPT

## 2021-09-07 PROCEDURE — 6360000002 HC RX W HCPCS: Performed by: EMERGENCY MEDICINE

## 2021-09-07 PROCEDURE — 99283 EMERGENCY DEPT VISIT LOW MDM: CPT

## 2021-09-07 PROCEDURE — 87081 CULTURE SCREEN ONLY: CPT

## 2021-09-07 RX ORDER — DEXAMETHASONE 4 MG/1
10 TABLET ORAL ONCE
Status: COMPLETED | OUTPATIENT
Start: 2021-09-07 | End: 2021-09-07

## 2021-09-07 RX ADMIN — DEXAMETHASONE 10 MG: 4 TABLET ORAL at 09:12

## 2021-09-07 ASSESSMENT — PAIN DESCRIPTION - PROGRESSION: CLINICAL_PROGRESSION: GRADUALLY WORSENING

## 2021-09-07 ASSESSMENT — PAIN DESCRIPTION - PAIN TYPE: TYPE: ACUTE PAIN

## 2021-09-07 ASSESSMENT — PAIN SCALES - GENERAL: PAINLEVEL_OUTOF10: 3

## 2021-09-07 ASSESSMENT — PAIN DESCRIPTION - ONSET: ONSET: PROGRESSIVE

## 2021-09-07 ASSESSMENT — PAIN DESCRIPTION - LOCATION: LOCATION: THROAT

## 2021-09-07 ASSESSMENT — PAIN DESCRIPTION - FREQUENCY: FREQUENCY: CONTINUOUS

## 2021-09-07 NOTE — ED PROVIDER NOTES
[facility]      CHIEF COMPLAINT  Pharyngitis (sore throat started last night)       HISTORY OF PRESENT ILLNESS  Alonzo Bellamy is a 43 y.o. female  who presents to the ED complaining of acute pharyngitis. Symptoms started over the past day. No fevers at all. She does have a history of asthma and just feels like her throat is tight like she cannot get a deep breath. She denies any history of diabetes. Non-smoker. She is vaccinated against Covid fully. No short of breath. No vomiting or diarrhea. No known sick contacts and exposures to strep that she is aware of. No other complaints, modifying factors or associated symptoms. I have reviewed the following from the nursing documentation. Past Medical History:   Diagnosis Date    Anemia     Arthritis     Asthma     Back pain     Chronic kidney disease     proteinuria    Depression     bipolar depression-no meds    Macrosomia     history of in  9lb 3 oz    Molar pregnancy     history of and also history of a partial molar pregnancy    Postpartum depression     baby blues after 2 deliveries    Proteinuria     Sleep apnea     cpap    Trichomonas infection 2016    history of    Vitamin D deficiency      Past Surgical History:   Procedure Laterality Date    ABDOMEN SURGERY      molar pregnancy      SECTION      CHOLECYSTECTOMY      DILATION AND CURETTAGE OF UTERUS  13    SUCTION    OTHER SURGICAL HISTORY  14    D & E    NM EGD INTRMURAL NEEDLE ASPIR/BIOP ALTERED ANATOMY N/A 10/8/2018    UPPER EUS/EGD NF W/ANES.  performed by Salma Armstrong DO at [facility] 5 N/A 2018    EGD BIOPSY performed by Renee Gitelman, MD at [facility] 5 N/A 10/8/2018    EGD BIOPSY performed by Salma Armstrong DO at [facility]     Family History   Problem Relation Age of Onset    Arthritis Father     Alcohol Abuse Father     Diabetes Paternal Aunt     Mental Illness Paternal Aunt     Arthritis Paternal Aunt     Glaucoma Paternal Aunt     High Blood Pressure Paternal Grandmother     Stroke Paternal Grandmother     Diabetes Paternal Grandmother     High Blood Pressure Paternal Grandfather     Diabetes Paternal Grandfather     Alcohol Abuse Mother     Alcohol Abuse Maternal Aunt     Alcohol Abuse Maternal Grandmother      Social History     Socioeconomic History    Marital status:      Spouse name: Not on file    Number of children: Not on file    Years of education: Not on file    Highest education level: Not on file   Occupational History    Not on file   Tobacco Use    Smoking status: Never Smoker    Smokeless tobacco: Never Used   Vaping Use    Vaping Use: Never used   Substance and Sexual Activity    Alcohol use: No    Drug use: No    Sexual activity: Not Currently   Other Topics Concern    Not on file   Social History Narrative    Not on file     Social Determinants of Health     Financial Resource Strain:     Difficulty of Paying Living Expenses:    Food Insecurity:     Worried About Running Out of Food in the Last Year:     [address] in the Last Year:    Transportation Needs:     Lack of Transportation (Medical):  Lack of Transportation (Non-Medical):    Physical Activity:     Days of Exercise per Week:     Minutes of Exercise per Session:    Stress:     Feeling of Stress :    Social Connections:     Frequency of Communication with Friends and Family:     Frequency of Social Gatherings with Friends and Family:     Attends [employer] Services:     Active Member of Clubs or Organizations:     Attends Club or Organization Meetings:     Marital Status:    Intimate Partner Violence:     Fear of Current or Ex-Partner:     Emotionally Abused:     Physically Abused:     Sexually Abused:      No current facility-administered medications for this encounter. Strep screen group a throat    Specimen: Throat   Result Value Ref Range    Rapid Strep A Screen Negative Negative       RADIOLOGY  None     ED COURSE/MDM  Patient seen and evaluated. Old records reviewed. Labs reviewed and results discussed with patient. Patient presenting for evaluation of acute pharyngitis. Rapid strep negative. She is vaccinated against Covid and has not had any known exposures and does not wish to be tested for Covid at this time. Have a lower suspicion of such. However given the pandemic cannot fully rule it out. No evidence of airway compromise. She is breathing comfortably. Will treat with oral Decadron and have patient follow very closely as an outpatient for recheck. She felt very comfortable with that plan. Reasons to return to the ER were discussed and all questions were answered at time of discharge    I estimate there is LOW risk for a ANAPHYLAXIS, DEEP SPACE INFECTION (e.g., UMAIRS ANGINA OR RETROPHARYNGEAL ABSCESS), EPIGLOTTITIS, MENINGITIS, or AIRWAY COMPROMISE, thus I consider the discharge disposition reasonable. Also, there is no evidence or peritonitis, sepsis, or toxicity. Janis Merchant and I have discussed the diagnosis and risks, and we agree with discharging home to follow-up with their primary doctor. We also discussed returning to the Emergency Department immediately if new or worsening symptoms occur. We have discussed the symptoms which are most concerning (e.g., changing or worsening pain, trouble swallowing or breathing, neck stiffness or fever) that necessitate immediate return. During the patient's ED course, the patient was given:  Medications   dexamethasone (DECADRON) tablet 10 mg (10 mg Oral Given 9/7/21 0912)        CLINICAL IMPRESSION  1. Acute pharyngitis, unspecified etiology    2. History of asthma        Blood pressure (!) 135/92, pulse 89, temperature 99.1 °F (37.3 °C), temperature source Oral, resp.  rate 18, height 5' 7\" (1.702 m), weight (!) 365 lb (165.6 kg), SpO2 99 %, not currently breastfeeding. Perfecto Rojas was discharged to home in stable condition. Patient was given scripts for the following medications. I counseled patient how to take these medications. New Prescriptions    No medications on file       Follow-up with:  [facility]) Pre-Services  [phone]  Schedule an appointment as soon as possible for a visit in 2 days  For recheck, To establish care with a primary care physician (if you do not already have one)      DISCLAIMER: This chart was created using Dragon dictation software. Efforts were made by me to ensure accuracy, however some errors may be present due to limitations of this technology and occasionally words are not transcribed correctly.         Alfonso Malik MD  09/07/21 6066

## 2021-09-09 LAB — S PYO THROAT QL CULT: NORMAL

## 2024-05-10 NOTE — PROGRESS NOTES
I reviewed the H&P, I examined the patient, and there are no changes in the patient's condition.     Patient ID: Lj Mcmillan is a 38 y.o. female who is being seen today for   Chief Complaint   Patient presents with    Sleep Apnea     6 wk follow up         HPI:     Lj Mcmillan is a 38 y.o. female for televideo appointment via video and audio doxy. me virtual visit for SANTINO follow up. States she has not been using CPAP. States DME told her they didn't have new orders that were sent after last visit for mask fitting. SHe did not call office. States she thinks when she gets new mask she will do better with CPAP. States tried with humidification off but mask was still an issue. The pressure is well tolerated. The mask is not  comfortable- full face. No mask leak. No significant daytime sleepiness. No  driving. No dry nose or throat. +fatigue. Bedtime is 10 pm and rise time is 7 am. Sleep onset is few-40 minutes. Wakes up 2-3 times at night total. 2 nocturia. It takes few- unknown minutes to fall back a sleep. No naps during the day. Sometimes  headache in am. No weight gain. 2-3caffienated beverages during the day. No alcohol. ESS is 3. Previous HPI 9/2/20  Lj Mcmillan is a 38 y.o. female for televideo appointment via video and audio doxy. me virtual visit for SANTINO follow up. States she has not been using CPAP due to feels pressure is too low and it was spitting water at her. [place] like it was drowning her. STates she decreased humidification some but was still having same issue    Using humidifier. Occasional snoring on CPAP. The mask is not very comfortable- full face- feels it didn't fit right, too small. No mask leak. Some  daytime sleepiness. No nodding off when driving, can get sleepy. No dry nose or throat.  +fatigue. Bedtime is 9-10 pm and rise time is 9 am. Sleep onset is usually few minutes. Wakes up 0 times at night total. No nocturia. It takes unknown minutes to fall back a sleep. No naps during the day. +headache in am. No weight gain. 6-7 caffienated beverages during the day. No alcohol.  Plans on bariatric weight loss surgery at some point. ESS is 11      Sleep Medicine 10/22/2020 2020 2018 2018 2018 2018   Sitting and reading 0 0 0 0 3 1   Watching TV 1 2 3 3 3 2   Sitting, inactive in a public place (e.g. a theatre or a meeting) 0 0 0 0 2 0   As a passenger in a car for an hour without a break 1 3 3 0 3 3   Lying down to rest in the afternoon when circumstances permit 1 2 2 3 1 1   Sitting and talking to someone 0 2 0 0 0 0   Sitting quietly after a lunch without alcohol 0 0 3 0 0 0   In a car, while stopped for a few minutes in traffic 0 2 0 0 0 0   Total score 3 11 11 6 12 7   Neck circumference - - 14.75 17 17 16.75       Past Medical History:  Past Medical History:   Diagnosis Date    Anemia     Arthritis     Asthma     Back pain     Chronic kidney disease     proteinuria    Depression     bipolar depression-no meds    Macrosomia     history of in  9lb 3 oz    Molar pregnancy     history of and also history of a partial molar pregnancy    Postpartum depression     baby blues after 2 deliveries    Proteinuria     Sleep apnea     cpap    Trichomonas infection 2016    history of    Vitamin D deficiency        Past Surgical History:        Procedure Laterality Date    ABDOMEN SURGERY      molar pregnancy      SECTION      CHOLECYSTECTOMY      DILATION AND CURETTAGE OF UTERUS  13    SUCTION    OTHER SURGICAL HISTORY  14    D & E    SC EGD INTRMURAL NEEDLE ASPIR/BIOP ALTERED ANATOMY N/A 10/8/2018    UPPER EUS/EGD NF W/ANES. performed by Kishor Pride DO at [facility] N/A 2018    EGD BIOPSY performed by Kavon Esteban MD at [facility] N/A 10/8/2018    EGD BIOPSY performed by Kishor Pride DO at [address]  [facility]       Allergies:  is allergic to sulfamethoxazole and oxycodone.   Social History:    TOBACCO:   reports that she has never smoked. She has never used smokeless tobacco.  ETOH:   reports no history of alcohol use. Family History:       Problem Relation Age of Onset    Arthritis Father     Alcohol Abuse Father     Diabetes Paternal Aunt     Mental Illness Paternal Aunt     Arthritis Paternal [de-identified]     Glaucoma Paternal Aunt     High Blood Pressure Paternal Grandmother     Stroke Paternal Grandmother     Diabetes Paternal Grandmother     High Blood Pressure Paternal Grandfather     Diabetes Paternal Grandfather     Alcohol Abuse Mother     Alcohol Abuse Maternal Aunt     Alcohol Abuse Maternal Grandmother        Current Medications:    Current Outpatient Medications:     buPROPion (WELLBUTRIN SR) 150 MG extended release tablet, Take 150 mg by mouth 2 times daily, Disp: , Rfl:     busPIRone (BUSPAR) 10 MG tablet, Take 10 mg by mouth 3 times daily, Disp: , Rfl:     ziprasidone (GEODON) 20 MG capsule, Take 20 mg by mouth 2 times daily (with meals), Disp: , Rfl:     montelukast (SINGULAIR) 10 MG tablet, Take 10 mg by mouth nightly, Disp: , Rfl:     hydrOXYzine (VISTARIL) 25 MG capsule, nightly , Disp: , Rfl:     ferrous sulfate 325 (65 Fe) MG tablet, Take 325 mg by mouth daily (with breakfast), Disp: , Rfl:     albuterol (PROVENTIL HFA;VENTOLIN HFA) 108 (90 BASE) MCG/ACT inhaler, Inhale 2 puffs into the lungs every 6 hours as needed. , Disp: , Rfl:     Plecanatide (TRULANCE) 3 MG TABS, Take by mouth daily, Disp: , Rfl:     oxybutynin (DITROPAN-XL) 10 MG extended release tablet, , Disp: , Rfl:     propranolol (INDERAL) 20 MG tablet, 2 times daily , Disp: , Rfl:     medroxyPROGESTERone (DEPO-PROVERA) 150 MG/ML injection, Inject into the muscle, Disp: , Rfl:     NONFORMULARY, Patient unsure of name, injected birth control, Disp: , Rfl:       Objective:   PHYSICAL EXAM:    There were no vitals taken for this visit. Physical exam:  Exam:  Gen: No acute distress, does not appear to be in pain.  Appears well bed time. - Patient counseled to never drive or operate heavy machinery while fatigue, drowsy or sleepy. - Weight loss is recommended as a long-term intervention.    - Complications of SANTINO if not treated were discussed with patient patient, including: systemic hypertension, pulmonary hypertension, cardiovascular morbidities, car accidents and all cause mortality.  -Patient education regarding sleep tips and CPAP cleaning recommendations     Follow up: 6 weeks, sooner if needed    Consent for telehealth visit was obtained and is noted in chart      [address]. Laura Chaudhari is a 38 y.o. female being evaluated by a Virtual Visit (video visit) encounter to address concerns as mentioned above. A caregiver was present when appropriate. Due to this being a TeleHealth encounter (During [place] [address] public health emergency), evaluation of the following organ systems was limited: Vitals/Constitutional/EENT/Resp/CV/GI//MS/Neuro/Skin/Heme-Lymph-Imm. Pursuant to the emergency declaration under the [address] [place] [address] and the [email or web address] and [employer] Act, this Virtual Visit was conducted with patient's (and/or legal guardian's) consent, to reduce the patient's risk of exposure to COVID-19 and provide necessary medical care. The patient (and/or legal guardian) has also been advised to contact this office for worsening conditions or problems, and seek emergency medical treatment and/or call 911 if deemed necessary. Patient identification was verified at the start of the visit: Yes      Services were provided through a video synchronous discussion virtually to substitute for in-person clinic visit. Patient and provider were located at their individual homes. --ELIAS Parsons - CNP on 10/22/2020 at 3:50 PM    An electronic signature was used to authenticate this note.

## 2024-08-09 ENCOUNTER — OFFICE VISIT (OUTPATIENT)
Age: 39
End: 2024-08-09

## 2024-08-09 VITALS
OXYGEN SATURATION: 97 % | HEART RATE: 82 BPM | SYSTOLIC BLOOD PRESSURE: 106 MMHG | DIASTOLIC BLOOD PRESSURE: 70 MMHG | BODY MASS INDEX: 54.5 KG/M2 | WEIGHT: 293 LBS | TEMPERATURE: 98.4 F

## 2024-08-09 DIAGNOSIS — N39.0 URINARY TRACT INFECTION WITHOUT HEMATURIA, SITE UNSPECIFIED: Primary | ICD-10-CM

## 2024-08-09 DIAGNOSIS — R30.0 DYSURIA: ICD-10-CM

## 2024-08-09 LAB
APPEARANCE FLUID: ABNORMAL
BILIRUBIN, POC: ABNORMAL
BLOOD URINE, POC: NEGATIVE
CLARITY, POC: ABNORMAL
COLOR, POC: ABNORMAL
GLUCOSE URINE, POC: NEGATIVE
KETONES, POC: ABNORMAL
LEUKOCYTE EST, POC: NEGATIVE
NITRITE, POC: NEGATIVE
PH, POC: 6
PROTEIN, POC: ABNORMAL
SPECIFIC GRAVITY, POC: >1.03
UROBILINOGEN, POC: 1

## 2024-08-09 RX ORDER — ARIPIPRAZOLE 10 MG/1
10 TABLET ORAL NIGHTLY
COMMUNITY
Start: 2024-06-21

## 2024-08-09 RX ORDER — TOPIRAMATE 25 MG/1
25 TABLET ORAL NIGHTLY
COMMUNITY
Start: 2024-03-20

## 2024-08-09 RX ORDER — ESCITALOPRAM OXALATE 20 MG/1
20 TABLET ORAL DAILY
COMMUNITY
Start: 2023-07-07

## 2024-08-09 RX ORDER — PHENAZOPYRIDINE HYDROCHLORIDE 100 MG/1
100 TABLET, FILM COATED ORAL 3 TIMES DAILY PRN
COMMUNITY
Start: 2024-05-09

## 2024-08-09 RX ORDER — OXYBUTYNIN CHLORIDE 5 MG/1
5 TABLET ORAL 2 TIMES DAILY
COMMUNITY
Start: 2024-06-21

## 2024-08-09 RX ORDER — BUSPIRONE HYDROCHLORIDE 15 MG/1
15 TABLET ORAL 3 TIMES DAILY
COMMUNITY
Start: 2024-06-21

## 2024-08-09 RX ORDER — NITROFURANTOIN 25; 75 MG/1; MG/1
100 CAPSULE ORAL 2 TIMES DAILY
Qty: 10 CAPSULE | Refills: 0 | Status: SHIPPED | OUTPATIENT
Start: 2024-08-09 | End: 2024-08-14

## 2024-08-09 RX ORDER — CYCLOBENZAPRINE HCL 10 MG
10 TABLET ORAL 2 TIMES DAILY PRN
COMMUNITY
Start: 2024-03-20

## 2024-08-09 RX ORDER — METOPROLOL SUCCINATE 100 MG/1
100 TABLET, EXTENDED RELEASE ORAL DAILY
COMMUNITY

## 2024-08-09 RX ORDER — HYDROXYZINE HYDROCHLORIDE 25 MG/1
25 TABLET, FILM COATED ORAL EVERY 8 HOURS PRN
COMMUNITY
Start: 2023-07-07

## 2024-08-09 NOTE — PROGRESS NOTES
[facility]    Laureen Jordan (:  1985) is a 39 y.o. female, here for evaluation of the following chief complaint(s):  Urinary Tract Infection    ASSESSMENT/PLAN:  Visit Diagnoses and Associated Orders       Urinary tract infection without hematuria, site unspecified    -  Primary         Dysuria        POCT Urinalysis no Micro [41548 Custom]           ORDERS WITHOUT AN ASSOCIATED DIAGNOSIS    ARIPiprazole (ABILIFY) 10 MG tablet [61719]      busPIRone (BUSPAR) 15 MG tablet [48710]      CARBONYL IRON PO [05044]      cyclobenzaprine (FLEXERIL) 10 MG tablet [2017]      escitalopram (LEXAPRO) 20 MG tablet [90957]      hydrOXYzine HCl (ATARAX) 25 MG tablet [3774]      metoprolol succinate (TOPROL XL) 100 MG extended release tablet [61702]      mometasone-formoterol (DULERA) 100-5 MCG/ACT inhaler [938818]      oxyBUTYnin (DITROPAN) 5 MG tablet [5938]      phenazopyridine (PYRIDIUM) 100 MG tablet [6193]      topiramate (TOPAMAX) 25 MG tablet [82729]      nitrofurantoin, macrocrystal-monohydrate, (MACROBID) 100 MG capsule [54123]             Summary  - Urinalysis showed no significant or relevant findings but due to her reported history of UTIs, I will be treating her with an antibiotic.   - A urine culture has been ordered to further determine and/or rule out the cause for this patient's complaint.   - The patient requested immediate treatment for the suspected disease(s).  - Follow up discussed and will be on an as-needed basis.  Differentials: Urethritis, Chlamydia, Gonorrhea, Trichomoniasis, Candidal Infection, Bacterial Vaginosis, Pelvis inflammatory disease.    SUBJECTIVE/OBJECTIVE:  ARSENIO Garduno presents to the clinic with concerns regarding a possible genitourinary infection. Onset for this issue was 3 day(s) ago. She reports symptoms of dysuria and frequency. She denies abdominal discomfort, back pain, and fever. Furthermore, she reports a history of UTIs.      Vitals:    24 1712   BP: 106/70

## 2024-08-11 LAB — BACTERIA UR CULT: NORMAL

## (undated) DEVICE — FORCEP BX STD CAP 240CM RAD JAW 4

## (undated) DEVICE — GLOVE SURG SZ 75 L12IN FNGR THK87MIL WHT LTX FREE

## (undated) DEVICE — CONMED SCOPE SAVER BITE BLOCK, 20X27 MM: Brand: SCOPE SAVER

## (undated) DEVICE — GOWN ISOLATN CVR DISP HIRISK IMPERV UNIV POLYPR IMPERV

## (undated) DEVICE — ENDO CARRY-ON PROCEDURE KIT INCLUDES SUCTION TUBING, LUBRICANT, GAUZE, BIOHAZARD STICKER, TRANSPORT PAD AND INTERCEPT BEDSIDE KIT.: Brand: ENDO CARRY-ON PROCEDURE KIT

## (undated) DEVICE — CARDINAL HEALTH ISOLATION GOWN UNIVERSAL SIZE BLUE SMS NECK TIES ELASTIC WRISTS: Brand: CARDINAL HEALTH

## (undated) DEVICE — ENDO CARRY-ON PROCEDURE KIT: Brand: ENDO CARRY-ON PROCEDURE KIT

## (undated) DEVICE — FORCEPS BX OVL CUP FEN DISPOSABLE CAP L 160CM RAD JAW 4